# Patient Record
Sex: FEMALE | Race: WHITE | NOT HISPANIC OR LATINO | Employment: OTHER | URBAN - METROPOLITAN AREA
[De-identification: names, ages, dates, MRNs, and addresses within clinical notes are randomized per-mention and may not be internally consistent; named-entity substitution may affect disease eponyms.]

---

## 2018-08-23 ENCOUNTER — OFFICE VISIT (OUTPATIENT)
Dept: FAMILY MEDICINE | Facility: CLINIC | Age: 68
End: 2018-08-23
Payer: MEDICARE

## 2018-08-23 VITALS
DIASTOLIC BLOOD PRESSURE: 70 MMHG | HEIGHT: 63 IN | HEART RATE: 87 BPM | WEIGHT: 245.81 LBS | OXYGEN SATURATION: 95 % | SYSTOLIC BLOOD PRESSURE: 130 MMHG | BODY MASS INDEX: 43.55 KG/M2 | TEMPERATURE: 98 F

## 2018-08-23 DIAGNOSIS — E78.5 HYPERLIPIDEMIA, UNSPECIFIED HYPERLIPIDEMIA TYPE: ICD-10-CM

## 2018-08-23 DIAGNOSIS — M94.0 COSTOCHONDRITIS: Primary | ICD-10-CM

## 2018-08-23 DIAGNOSIS — E11.9 CONTROLLED TYPE 2 DIABETES MELLITUS WITHOUT COMPLICATION, WITHOUT LONG-TERM CURRENT USE OF INSULIN: ICD-10-CM

## 2018-08-23 PROCEDURE — 99204 OFFICE O/P NEW MOD 45 MIN: CPT | Mod: PBBFAC,PO | Performed by: FAMILY MEDICINE

## 2018-08-23 PROCEDURE — 99999 PR PBB SHADOW E&M-NEW PATIENT-LVL IV: CPT | Mod: PBBFAC,,, | Performed by: FAMILY MEDICINE

## 2018-08-23 PROCEDURE — 99204 OFFICE O/P NEW MOD 45 MIN: CPT | Mod: S$PBB,,, | Performed by: FAMILY MEDICINE

## 2018-08-23 RX ORDER — DICLOFENAC SODIUM 50 MG/1
50 TABLET, DELAYED RELEASE ORAL 2 TIMES DAILY PRN
Qty: 45 TABLET | Refills: 0 | Status: SHIPPED | OUTPATIENT
Start: 2018-08-23 | End: 2019-10-22

## 2018-08-23 RX ORDER — OMEPRAZOLE 20 MG/1
CAPSULE, DELAYED RELEASE ORAL
Refills: 0 | COMMUNITY
Start: 2018-07-30 | End: 2023-11-16 | Stop reason: SDUPTHER

## 2018-08-23 RX ORDER — CALCIUM CARBONATE/VITAMIN D3 500-10/5ML
LIQUID (ML) ORAL ONCE
COMMUNITY
End: 2019-10-22

## 2018-08-23 RX ORDER — FLUTICASONE PROPIONATE AND SALMETEROL 250; 50 UG/1; UG/1
1 POWDER RESPIRATORY (INHALATION)
COMMUNITY
End: 2018-08-23 | Stop reason: SDUPTHER

## 2018-08-23 RX ORDER — PHENYLEPHRINE HCL 10 MG/1
10 TABLET, FILM COATED ORAL
COMMUNITY
End: 2019-10-22

## 2018-08-23 RX ORDER — ALBUTEROL SULFATE 90 UG/1
2 AEROSOL, METERED RESPIRATORY (INHALATION) EVERY 6 HOURS PRN
COMMUNITY

## 2018-08-23 RX ORDER — DILTIAZEM HYDROCHLORIDE 120 MG/1
CAPSULE, EXTENDED RELEASE ORAL
Refills: 4 | COMMUNITY
Start: 2018-07-30

## 2018-08-23 RX ORDER — EZETIMIBE 10 MG/1
10 TABLET ORAL
COMMUNITY
Start: 2018-08-21

## 2018-08-23 RX ORDER — FLUTICASONE PROPIONATE 50 MCG
2 SPRAY, SUSPENSION (ML) NASAL
COMMUNITY
Start: 2016-11-29 | End: 2019-10-22

## 2018-08-23 RX ORDER — ASPIRIN 81 MG/1
81 TABLET ORAL
COMMUNITY

## 2018-08-23 RX ORDER — METFORMIN HYDROCHLORIDE 500 MG/1
500 TABLET ORAL 2 TIMES DAILY WITH MEALS
COMMUNITY

## 2018-08-23 RX ORDER — TORSEMIDE 5 MG/1
TABLET ORAL
Refills: 4 | COMMUNITY
Start: 2018-08-06 | End: 2019-10-22

## 2018-08-23 RX ORDER — LOSARTAN POTASSIUM 50 MG/1
25 TABLET ORAL DAILY
Refills: 4 | COMMUNITY
Start: 2018-06-25

## 2018-08-23 RX ORDER — SPIRONOLACTONE 25 MG/1
12.5 TABLET ORAL DAILY
Refills: 10 | COMMUNITY
Start: 2018-07-16

## 2018-08-23 NOTE — PROGRESS NOTES
Subjective:       Patient ID: Anju Benitez is a 67 y.o. female.    Chief Complaint: Establish Care    Patient presents as a new patient to me. She has right posterior back pain that radiates to the front. She has had this for a week and a half. She states she has been sensitive to touch. She states it has been sensitive to touch. She denies trauma to her chest. She recently had her gallbladder removed on feb. 28, 2018. She ws sitting in her cehst when she noticed this. She has been taking ibuprofen, which helps. She states she chronically has coughing and wheezing, but nothing out of the ordinary. She had a chest xray 7 months ago that was normal.       Past Medical History:  No date: Allergy  No date: Arthritis  No date: Asthma  No date: CHF (congestive heart failure)  No date: Diabetes type 2, controlled  No date: Fatty liver  No date: Hyperlipemia  No date: Hypertension   Past Surgical History:  No date: CATARACT EXTRACTION, BILATERAL  No date: CHOLECYSTECTOMY  No date: meniscal tear  Review of patient's family history indicates:  Problem: Arthritis      Relation: Mother          Age of Onset: (Not Specified)  Problem: Asthma      Relation: Mother          Age of Onset: (Not Specified)  Problem: Heart disease      Relation: Mother          Age of Onset: (Not Specified)  Problem: Kidney disease      Relation: Mother          Age of Onset: (Not Specified)  Problem: Stroke      Relation: Mother          Age of Onset: (Not Specified)  Problem: Alcohol abuse      Relation: Father          Age of Onset: (Not Specified)  Problem: Kidney disease      Relation: Father          Age of Onset: (Not Specified)    Social History    Socioeconomic History      Marital status:       Spouse name: Not on file      Number of children: Not on file      Years of education: Not on file      Highest education level: Not on file    Social Needs      Financial resource strain: Not on file      Food insecurity - worry: Not on  "file      Food insecurity - inability: Not on file      Transportation needs - medical: Not on file      Transportation needs - non-medical: Not on file    Occupational History      Not on file    Tobacco Use      Smoking status: Former Smoker        Packs/day: 1.00        Years: 32.00        Pack years: 32        Types: Cigarettes        Quit date: 8/3/2003        Years since quitting: 15.0    Substance and Sexual Activity      Alcohol use: No      Drug use: No      Sexual activity: Not on file    Other Topics      Concerns:        Not on file    Social History Narrative      Not on file          Review of Systems    Objective:       Vitals:    08/23/18 1323   BP: 130/70   Pulse: 87   Temp: 98.1 °F (36.7 °C)   TempSrc: Oral   SpO2: 95%   Weight: 111.5 kg (245 lb 13 oz)   Height: 5' 3" (1.6 m)       Physical Exam   Constitutional: She is oriented to person, place, and time. She appears well-developed and well-nourished. No distress.   HENT:   Head: Normocephalic.   Right Ear: External ear normal.   Left Ear: External ear normal.   Mouth/Throat: Oropharynx is clear and moist. No oropharyngeal exudate.   Eyes: Conjunctivae are normal.   Neck: Normal range of motion. Neck supple. No thyromegaly present.   Cardiovascular: Normal rate, regular rhythm and normal heart sounds. Exam reveals no gallop and no friction rub.   No murmur heard.  Pulmonary/Chest: Effort normal and breath sounds normal. No stridor. No respiratory distress. She has no wheezes. She has no rales. She exhibits tenderness.   Musculoskeletal: She exhibits no edema.   Lymphadenopathy:     She has no cervical adenopathy.   Neurological: She is alert and oriented to person, place, and time.   Skin: She is not diaphoretic.   Psychiatric: She has a normal mood and affect.       Assessment:       1. Costochondritis    2. Hyperlipidemia, unspecified hyperlipidemia type    3. Controlled type 2 diabetes mellitus without complication, without long-term current " use of insulin        Plan:       Anju was seen today for establish care.    Diagnoses and all orders for this visit:    Costochondritis  -     diclofenac (VOLTAREN) 50 MG EC tablet; Take 1 tablet (50 mg total) by mouth 2 (two) times daily as needed.    Hyperlipidemia, unspecified hyperlipidemia type    Controlled type 2 diabetes mellitus without complication, without long-term current use of insulin    mammogram is on 9/5/18  Her last colonoscopy was 10 years ago with Dr. Ck Ruth  Eye exam was last year with Dr. Yap.

## 2018-09-12 ENCOUNTER — LAB VISIT (OUTPATIENT)
Dept: LAB | Facility: HOSPITAL | Age: 68
End: 2018-09-12
Attending: FAMILY MEDICINE
Payer: MEDICARE

## 2018-09-12 DIAGNOSIS — E11.9 CONTROLLED TYPE 2 DIABETES MELLITUS WITHOUT COMPLICATION, WITHOUT LONG-TERM CURRENT USE OF INSULIN: ICD-10-CM

## 2018-09-12 DIAGNOSIS — E78.5 HYPERLIPIDEMIA, UNSPECIFIED HYPERLIPIDEMIA TYPE: ICD-10-CM

## 2018-09-12 LAB
ALBUMIN SERPL BCP-MCNC: 3.6 G/DL
ALP SERPL-CCNC: 70 U/L
ALT SERPL W/O P-5'-P-CCNC: 65 U/L
ANION GAP SERPL CALC-SCNC: 8 MMOL/L
AST SERPL-CCNC: 68 U/L
BILIRUB SERPL-MCNC: 0.6 MG/DL
BUN SERPL-MCNC: 10 MG/DL
CALCIUM SERPL-MCNC: 10.1 MG/DL
CHLORIDE SERPL-SCNC: 103 MMOL/L
CHOLEST SERPL-MCNC: 173 MG/DL
CHOLEST/HDLC SERPL: 4.9 {RATIO}
CO2 SERPL-SCNC: 28 MMOL/L
CREAT SERPL-MCNC: 0.8 MG/DL
EST. GFR  (AFRICAN AMERICAN): >60 ML/MIN/1.73 M^2
EST. GFR  (NON AFRICAN AMERICAN): >60 ML/MIN/1.73 M^2
GLUCOSE SERPL-MCNC: 110 MG/DL
HDLC SERPL-MCNC: 35 MG/DL
HDLC SERPL: 20.2 %
LDLC SERPL CALC-MCNC: 118.8 MG/DL
NONHDLC SERPL-MCNC: 138 MG/DL
POTASSIUM SERPL-SCNC: 5.1 MMOL/L
PROT SERPL-MCNC: 7.8 G/DL
SODIUM SERPL-SCNC: 139 MMOL/L
TRIGL SERPL-MCNC: 96 MG/DL

## 2018-09-12 PROCEDURE — 83036 HEMOGLOBIN GLYCOSYLATED A1C: CPT

## 2018-09-12 PROCEDURE — 80053 COMPREHEN METABOLIC PANEL: CPT

## 2018-09-12 PROCEDURE — 36415 COLL VENOUS BLD VENIPUNCTURE: CPT | Mod: PO

## 2018-09-12 PROCEDURE — 80061 LIPID PANEL: CPT

## 2018-09-13 LAB
ESTIMATED AVG GLUCOSE: 143 MG/DL
HBA1C MFR BLD HPLC: 6.6 %

## 2018-09-17 ENCOUNTER — HOSPITAL ENCOUNTER (OUTPATIENT)
Dept: RADIOLOGY | Facility: HOSPITAL | Age: 68
Discharge: HOME | End: 2018-09-17
Attending: FAMILY MEDICINE
Payer: MEDICARE

## 2018-09-17 ENCOUNTER — OFFICE VISIT (OUTPATIENT)
Dept: FAMILY MEDICINE | Facility: CLINIC | Age: 68
End: 2018-09-17
Payer: MEDICARE

## 2018-09-17 VITALS
OXYGEN SATURATION: 97 % | DIASTOLIC BLOOD PRESSURE: 72 MMHG | WEIGHT: 242.5 LBS | TEMPERATURE: 99 F | HEART RATE: 75 BPM | BODY MASS INDEX: 42.97 KG/M2 | SYSTOLIC BLOOD PRESSURE: 120 MMHG | HEIGHT: 63 IN

## 2018-09-17 DIAGNOSIS — R07.81 RIB PAIN ON RIGHT SIDE: Primary | ICD-10-CM

## 2018-09-17 DIAGNOSIS — R07.81 RIB PAIN ON RIGHT SIDE: ICD-10-CM

## 2018-09-17 PROCEDURE — 99214 OFFICE O/P EST MOD 30 MIN: CPT | Mod: PBBFAC,25,PO | Performed by: FAMILY MEDICINE

## 2018-09-17 PROCEDURE — 71100 X-RAY EXAM RIBS UNI 2 VIEWS: CPT | Mod: 26,,, | Performed by: RADIOLOGY

## 2018-09-17 PROCEDURE — 99214 OFFICE O/P EST MOD 30 MIN: CPT | Mod: S$PBB,,, | Performed by: FAMILY MEDICINE

## 2018-09-17 PROCEDURE — 71100 X-RAY EXAM RIBS UNI 2 VIEWS: CPT | Mod: TC,FY

## 2018-09-17 PROCEDURE — 99999 PR PBB SHADOW E&M-EST. PATIENT-LVL IV: CPT | Mod: PBBFAC,,, | Performed by: FAMILY MEDICINE

## 2018-09-17 NOTE — PROGRESS NOTES
"Subjective:       Patient ID: Anju Benitez is a 67 y.o. female.    Chief Complaint: Follow up/ Lab results    Patient is here for follow-up. She states she has had right posterior back pain that radiates to the front. She has had this for close to 2 months. It is sensitive to touch. She had no chest trauma. She was sitting when she noticed this pain for the first time. She states the diclofenac has helped, but it is still there. She states the pain is not worse with deep breaths. She has it in the afternoon or at nightgenerally. She states when she lies on her side she can feel a knot.   She has elevated liver enzymes and had a biopsy taken of her liver once she had her gallbladder removed and was told it was enlarged and fatty. She is wondering if this is the cause of her pain.       Review of Systems    Objective:       Vitals:    09/17/18 1112   BP: 120/72   Pulse: 75   Temp: 98.5 °F (36.9 °C)   TempSrc: Oral   SpO2: 97%   Weight: 110 kg (242 lb 8.1 oz)   Height: 5' 3" (1.6 m)       Physical Exam   Constitutional: She is oriented to person, place, and time. She appears well-developed and well-nourished. She appears distressed.   HENT:   Head: Normocephalic and atraumatic.   Cardiovascular: Normal rate, regular rhythm and normal heart sounds. Exam reveals no gallop and no friction rub.   No murmur heard.  Pulmonary/Chest: Effort normal and breath sounds normal. No stridor. No respiratory distress. She has no wheezes. She has no rales.         She exhibits tenderness.       Neurological: She is oriented to person, place, and time.   Skin: She is not diaphoretic.       Assessment:       1. Rib pain on right side        Plan:       Anju was seen today for follow up/ lab results.    Diagnoses and all orders for this visit:    Rib pain on right side  -     X-Ray Ribs 2 View Right; Future    will check for possible rib fracture. If none noted. Continue NSAIDs and no sleeping on that side as likely bruised.      she " had her pneumonia shot 2 years ago at [facility] in [place].  Due for colonoscopy,but declines at this time.   She gets her mammogram at [facility] as she goes there yearly. She had her last on on 10/17/18 as she had to reschedule.

## 2018-09-18 ENCOUNTER — TELEPHONE (OUTPATIENT)
Dept: FAMILY MEDICINE | Facility: CLINIC | Age: 68
End: 2018-09-18

## 2018-09-18 NOTE — TELEPHONE ENCOUNTER
----- Message from Desire Pisano sent at 9/18/2018  1:52 PM CDT -----  Contact: Self/ [phone]  Pt requesting chest xray results from yesterday's OV 9/17/18. Please call to advise. Thank you.

## 2018-10-19 LAB
HUMAN PAPILLOMAVIRUS (HPV): NORMAL
PAP SMEAR: NORMAL

## 2018-11-30 ENCOUNTER — TELEPHONE (OUTPATIENT)
Dept: FAMILY MEDICINE | Facility: CLINIC | Age: 68
End: 2018-11-30

## 2018-11-30 DIAGNOSIS — K64.9 HEMORRHOIDS, UNSPECIFIED HEMORRHOID TYPE: Primary | ICD-10-CM

## 2018-11-30 NOTE — TELEPHONE ENCOUNTER
----- Message from Oseas Emanuel sent at 11/30/2018  1:13 PM CST -----  Contact: Anju [phone]  The patient called to speak to the staff. She has been suffering with a stomach virus and hemorrhoids and would like to have something called in to the pharmacy for hemorrhoids. The patient has made an appointment with Ms. Ferrari for Monday, but says she is in extreme discomfort. Please call at your earliest convenience.

## 2018-12-03 ENCOUNTER — OFFICE VISIT (OUTPATIENT)
Dept: FAMILY MEDICINE | Facility: CLINIC | Age: 68
End: 2018-12-03
Payer: MEDICARE

## 2018-12-03 VITALS
SYSTOLIC BLOOD PRESSURE: 120 MMHG | HEIGHT: 63 IN | OXYGEN SATURATION: 98 % | BODY MASS INDEX: 41.45 KG/M2 | WEIGHT: 233.94 LBS | RESPIRATION RATE: 16 BRPM | DIASTOLIC BLOOD PRESSURE: 84 MMHG | TEMPERATURE: 98 F | HEART RATE: 85 BPM

## 2018-12-03 DIAGNOSIS — R19.5 DARK STOOLS: ICD-10-CM

## 2018-12-03 DIAGNOSIS — K64.9 HEMORRHOIDS, UNSPECIFIED HEMORRHOID TYPE: ICD-10-CM

## 2018-12-03 DIAGNOSIS — R19.7 DIARRHEA, UNSPECIFIED TYPE: Primary | ICD-10-CM

## 2018-12-03 PROCEDURE — 99213 OFFICE O/P EST LOW 20 MIN: CPT | Mod: S$PBB,,, | Performed by: PHYSICIAN ASSISTANT

## 2018-12-03 PROCEDURE — 99215 OFFICE O/P EST HI 40 MIN: CPT | Mod: PBBFAC,PO | Performed by: PHYSICIAN ASSISTANT

## 2018-12-03 PROCEDURE — 99999 PR PBB SHADOW E&M-EST. PATIENT-LVL V: CPT | Mod: PBBFAC,,, | Performed by: PHYSICIAN ASSISTANT

## 2018-12-03 RX ORDER — HYDROCORTISONE 25 MG/G
CREAM TOPICAL
Refills: 6 | COMMUNITY
Start: 2018-12-01 | End: 2019-10-22

## 2018-12-03 NOTE — PROGRESS NOTES
Subjective:       Patient ID: Anju Benitez is a 68 y.o. female with multiple medical diagnoses as listed in the medical history and problem list that presents for Diarrhea (8 days) and Hemorrhoids  .    Chief Complaint: Diarrhea (8 days) and Hemorrhoids      Diarrhea    This is a new problem. The current episode started 1 to 4 weeks ago (11/24). Episode frequency: everytime she eats or drinks  The problem has been unchanged. The patient states that diarrhea does not awaken her from sleep. Associated symptoms include abdominal pain (generalized; cramps that started last ) and a fever (102 when she went to [facility] but improved ). Pertinent negatives include no bloating, chills, increased  flatus or vomiting. Associated symptoms comments: Black, brown, now copper  slimmy   Blood yesterday not today . Exacerbated by: any food or drinks  Treatments tried: gatorade, sprite, pepto bismo after black stools, and immodium  hemmorrhage 1979 cipro caused this, stomach ulcer 30 years ago      She also had URI with the fever at [facility] but that has since all improved.     More than 10 years since last colonoscopy.   No sick contacts  No recent abx.   No recent hospital stay.     Review of Systems   Constitutional: Positive for fever (102 when she went to [facility] but improved ). Negative for chills.   HENT: Negative.    Respiratory: Negative.    Gastrointestinal: Positive for abdominal pain (generalized; cramps that started last ), blood in stool (once yesterday resolved ) and diarrhea. Negative for bloating, constipation, flatus, nausea and vomiting.         PAST MEDICAL HISTORY:  Past Medical History:   Diagnosis Date    Allergy     Arthritis     Asthma     CHF (congestive heart failure)     Diabetes type 2, controlled     Fatty liver     Hyperlipemia     Hypertension        SOCIAL HISTORY:  Social History     Socioeconomic History    Marital status:      Spouse name: Not on file    Number of  children: Not on file    Years of education: Not on file    Highest education level: Not on file   Social Needs    Financial resource strain: Not on file    Food insecurity - worry: Not on file    Food insecurity - inability: Not on file    Transportation needs - medical: Not on file    Transportation needs - non-medical: Not on file   Occupational History    Not on file   Tobacco Use    Smoking status: Former Smoker     Packs/day: 1.00     Years: 32.00     Pack years: 32.00     Types: Cigarettes     Last attempt to quit: 8/3/2003     Years since quitting: 15.3   Substance and Sexual Activity    Alcohol use: No    Drug use: No    Sexual activity: Not on file   Other Topics Concern    Not on file   Social History Narrative    Not on file       ALLERGIES AND MEDICATIONS: updated and reviewed.  Review of patient's allergies indicates:   Allergen Reactions    Amoxicillin Nausea And Vomiting    Ciprofloxacin Other (See Comments)     Bleeding from colon    Penicillins Rash     Current Outpatient Medications   Medication Sig Dispense Refill    albuterol (VENTOLIN HFA) 90 mcg/actuation inhaler Inhale 2 puffs into the lungs every 6 (six) hours as needed for Wheezing. Rescue      aspirin (ECOTRIN) 81 MG EC tablet Take 81 mg by mouth.      diclofenac (VOLTAREN) 50 MG EC tablet Take 1 tablet (50 mg total) by mouth 2 (two) times daily as needed. 45 tablet 0    diltiaZEM (DILACOR XR) 120 MG CDCR TAKE 1 CAPSULE(S) BY MOUTH TWICE DAILY  4    ezetimibe (ZETIA) 10 mg tablet       fluticasone (FLONASE) 50 mcg/actuation nasal spray 2 sprays by Nasal route.      fluticasone-salmeterol 250-50 mcg/dose (ADVAIR) 250-50 mcg/dose diskus inhaler Inhale 1 puff into the lungs 2 (two) times daily.        hydrocortisone (ANUSOL-HC) 2.5 % rectal cream PLACE 1 APPLICATORFUL RECTALLY TWICE DAILY  6    hydrocortisone-pramoxine (PROCTOFOAM-HS) rectal foam Place 1 applicator rectally 2 (two) times daily. 10 g 6    L gasseri/B  "bifidum/B longum ([employer] ORAL) Take by mouth.      losartan (COZAAR) 50 MG tablet Take 25 mg by mouth once daily.  4    magnesium oxide 400 mg Cap Take by mouth once.      metFORMIN (GLUCOPHAGE) 500 MG tablet Take 500 mg by mouth.      omega-3s-dha-epa-fish oil-D3 150-500-200 mg-mg-unit CpDR Take by mouth.      omeprazole (PRILOSEC) 20 MG capsule TAKE 1 CAPSULE(S) BY MOUTH ONCE A DAY  0    phenylephrine (SUDAFED PE) 10 MG Tab Take 10 mg by mouth.      spironolactone (ALDACTONE) 25 MG tablet Take 12.5 mg by mouth once daily.  10    torsemide (DEMADEX) 5 MG Tab TAKE 1 TABLET(S) BY MOUTH ONCE A DAY on MONDAYS AND THURSDAYS  4     No current facility-administered medications for this visit.          Objective:   /84   Pulse 85   Temp 98.2 °F (36.8 °C) (Oral)   Resp 16   Ht 5' 3" (1.6 m)   Wt 106.1 kg (233 lb 14.5 oz)   SpO2 98%   BMI 41.43 kg/m²      Physical Exam   Constitutional: No distress.   HENT:   Mouth/Throat: Oropharynx is clear and moist.   Cardiovascular: Normal rate and regular rhythm.   Pulmonary/Chest: Effort normal and breath sounds normal.   Abdominal: Soft. Normal appearance and bowel sounds are normal. There is generalized tenderness (mild ). There is no rigidity, no rebound, no guarding, no tenderness at McBurney's point and negative Lopez's sign.   Musculoskeletal: She exhibits no edema.           Assessment:       1. Diarrhea, unspecified type    2. Dark stools    3. Hemorrhoids, unspecified hemorrhoid type        Plan:       Diarrhea, unspecified type  -     Ambulatory referral to Gastroenterology  -     Case request GI: COLONOSCOPY  -     Stool Exam-Ova,Cysts,Parasites; Future; Expected date: 12/03/2018  -     WBC, Stool; Future; Expected date: 12/03/2018  -     Stool culture; Future; Expected date: 12/03/2018  -     POCT Occult Blood Stool: negative  Soft diet but try more solids like toast, grits, jello.  Continue to replace electrolytes    -will contact " with results       Dark stools  -     Ambulatory referral to Gastroenterology  -     POCT Occult Blood Stool    Hemorrhoids, unspecified hemorrhoid type  -     Ambulatory referral to Gastroenterology  -     Case request GI: COLONOSCOPY  Continue cream  Sitz baths            No Follow-up on file.

## 2018-12-06 ENCOUNTER — TELEPHONE (OUTPATIENT)
Dept: ADMINISTRATIVE | Facility: HOSPITAL | Age: 68
End: 2018-12-06

## 2018-12-11 ENCOUNTER — TELEPHONE (OUTPATIENT)
Dept: ADMINISTRATIVE | Facility: HOSPITAL | Age: 68
End: 2018-12-11

## 2019-02-01 ENCOUNTER — TELEPHONE (OUTPATIENT)
Dept: ADMINISTRATIVE | Facility: HOSPITAL | Age: 69
End: 2019-02-01

## 2019-02-01 RX ORDER — POLYETHYLENE GLYCOL-3350 AND ELECTROLYTES 236; 6.74; 5.86; 2.97; 22.74 G/274.31G; G/274.31G; G/274.31G; G/274.31G; G/274.31G
POWDER, FOR SOLUTION ORAL
Refills: 0 | COMMUNITY
Start: 2019-01-22 | End: 2019-10-22

## 2019-02-01 RX ORDER — DICYCLOMINE HYDROCHLORIDE 10 MG/1
CAPSULE ORAL
Refills: 0 | COMMUNITY
Start: 2018-12-04

## 2019-02-01 RX ORDER — AZITHROMYCIN 500 MG/1
TABLET, FILM COATED ORAL
Refills: 0 | COMMUNITY
Start: 2018-12-04 | End: 2019-10-22

## 2019-02-01 RX ORDER — DILTIAZEM HYDROCHLORIDE 120 MG/1
CAPSULE, EXTENDED RELEASE ORAL
Refills: 0 | COMMUNITY
Start: 2019-01-29 | End: 2019-10-22

## 2019-10-22 ENCOUNTER — OFFICE VISIT (OUTPATIENT)
Dept: FAMILY MEDICINE | Facility: CLINIC | Age: 69
End: 2019-10-22
Payer: MEDICARE

## 2019-10-22 VITALS — BODY MASS INDEX: 41.53 KG/M2 | WEIGHT: 234.38 LBS | HEIGHT: 63 IN

## 2019-10-22 DIAGNOSIS — E86.0 BODY WATER DEHYDRATION: ICD-10-CM

## 2019-10-22 DIAGNOSIS — E11.9 CONTROLLED TYPE 2 DIABETES MELLITUS WITHOUT COMPLICATION, WITHOUT LONG-TERM CURRENT USE OF INSULIN: Primary | ICD-10-CM

## 2019-10-22 DIAGNOSIS — E66.01 MORBID OBESITY: ICD-10-CM

## 2019-10-22 DIAGNOSIS — J44.9 CHRONIC OBSTRUCTIVE PULMONARY DISEASE, UNSPECIFIED COPD TYPE: ICD-10-CM

## 2019-10-22 PROBLEM — E78.5 HYPERLIPEMIA: Status: ACTIVE | Noted: 2018-10-23

## 2019-10-22 PROBLEM — N63.0 BREAST MASS: Status: ACTIVE | Noted: 2018-10-30

## 2019-10-22 PROCEDURE — 99999 PR PBB SHADOW E&M-EST. PATIENT-LVL II: CPT | Mod: PBBFAC,,, | Performed by: FAMILY MEDICINE

## 2019-10-22 PROCEDURE — 99212 OFFICE O/P EST SF 10 MIN: CPT | Mod: PBBFAC,PO | Performed by: FAMILY MEDICINE

## 2019-10-22 PROCEDURE — 99215 OFFICE O/P EST HI 40 MIN: CPT | Mod: S$PBB,,, | Performed by: FAMILY MEDICINE

## 2019-10-22 PROCEDURE — 99999 PR PBB SHADOW E&M-EST. PATIENT-LVL II: ICD-10-PCS | Mod: PBBFAC,,, | Performed by: FAMILY MEDICINE

## 2019-10-22 PROCEDURE — 99215 PR OFFICE/OUTPT VISIT, EST, LEVL V, 40-54 MIN: ICD-10-PCS | Mod: S$PBB,,, | Performed by: FAMILY MEDICINE

## 2019-10-22 RX ORDER — IBUPROFEN 600 MG/1
600 TABLET ORAL EVERY 8 HOURS PRN
COMMUNITY
Start: 2019-10-21

## 2019-10-22 RX ORDER — ATORVASTATIN CALCIUM 20 MG/1
20 TABLET, FILM COATED ORAL
COMMUNITY

## 2019-10-22 NOTE — PROGRESS NOTES
Chief Complaint   Patient presents with    Back Pain       SUBJECTIVE:  Anju Benitez is a 68 y.o. female here for new problem of right upper quadrant pain and radiates to the back and she felt a lump, > 3 months, multiple work up with US, mammography and CXR without any abnormal ;signs that we know of from this.  She has relief from the ibuprofen but otherwise it just comes back, position seems to worsen, otherwise it is normal.  No other signs  Currently has co-morbidities including per problem list.      Past Medical History:   Diagnosis Date    Allergy     Arthritis     Asthma     CHF (congestive heart failure)     Diabetes type 2, controlled     Fatty liver     Hyperlipemia     Hypertension      Past Surgical History:   Procedure Laterality Date    CATARACT EXTRACTION, BILATERAL      CHOLECYSTECTOMY      meniscal tear       Social History     Socioeconomic History    Marital status:      Spouse name: Not on file    Number of children: Not on file    Years of education: Not on file    Highest education level: Not on file   Occupational History    Not on file   Social Needs    Financial resource strain: Not on file    Food insecurity:     Worry: Not on file     Inability: Not on file    Transportation needs:     Medical: Not on file     Non-medical: Not on file   Tobacco Use    Smoking status: Former Smoker     Packs/day: 1.00     Years: 32.00     Pack years: 32.00     Types: Cigarettes     Last attempt to quit: 8/3/2003     Years since quittin.2   Substance and Sexual Activity    Alcohol use: No    Drug use: No    Sexual activity: Not on file   Lifestyle    Physical activity:     Days per week: Not on file     Minutes per session: Not on file    Stress: Not on file   Relationships    Social connections:     Talks on phone: Not on file     Gets together: Not on file     Attends Jew service: Not on file     Active member of club or organization: Not on file      Attends meetings of clubs or organizations: Not on file     Relationship status: Not on file   Other Topics Concern    Not on file   Social History Narrative    Not on file     Family History   Problem Relation Age of Onset    Arthritis Mother     Asthma Mother     Heart disease Mother     Kidney disease Mother     Stroke Mother     Alcohol abuse Father     Kidney disease Father      Current Outpatient Medications on File Prior to Visit   Medication Sig Dispense Refill    albuterol (VENTOLIN HFA) 90 mcg/actuation inhaler Inhale 2 puffs into the lungs every 6 (six) hours as needed for Wheezing. Rescue      aspirin (ECOTRIN) 81 MG EC tablet Take 81 mg by mouth.      atorvastatin (LIPITOR) 20 MG tablet Take 20 mg by mouth.      dicyclomine (BENTYL) 10 MG capsule take 1 CAPSULE(S) BY MOUTH TWICE DAILY AS NEEDED for stomach cramps  0    diltiaZEM (DILACOR XR) 120 MG CDCR TAKE 1 CAPSULE(S) BY MOUTH TWICE DAILY  4    ezetimibe (ZETIA) 10 mg tablet       fluticasone-salmeterol 250-50 mcg/dose (ADVAIR) 250-50 mcg/dose diskus inhaler Inhale 1 puff into the lungs 2 (two) times daily.        ibuprofen (ADVIL,MOTRIN) 600 MG tablet       losartan (COZAAR) 50 MG tablet Take 25 mg by mouth once daily.  4    metFORMIN (GLUCOPHAGE) 500 MG tablet Take 500 mg by mouth.      omeprazole (PRILOSEC) 20 MG capsule TAKE 1 CAPSULE(S) BY MOUTH ONCE A DAY  0    spironolactone (ALDACTONE) 25 MG tablet Take 12.5 mg by mouth once daily.  10     No current facility-administered medications on file prior to visit.      Review of patient's allergies indicates:   Allergen Reactions    Amoxicillin Nausea And Vomiting    Ciprofloxacin Other (See Comments)     Bleeding from colon    Penicillins Rash         Review of Systems   Constitutional: Negative.  Negative for malaise/fatigue.   HENT: Negative.  Negative for congestion.    Eyes: Negative.    Respiratory: Negative.    Cardiovascular: Negative.    Gastrointestinal: Negative.   "  Genitourinary: Negative.    Musculoskeletal: Positive for back pain and myalgias. Negative for joint pain and neck pain.   Skin: Negative.    Neurological: Negative.    Endo/Heme/Allergies: Negative.    Psychiatric/Behavioral: Positive for depression. The patient is not nervous/anxious.        OBJECTIVE:  Ht 5' 3" (1.6 m)   Wt 106.3 kg (234 lb 5.6 oz)   BMI 41.51 kg/m²     Wt Readings from Last 3 Encounters:   10/22/19 106.3 kg (234 lb 5.6 oz)   12/03/18 106.1 kg (233 lb 14.5 oz)   09/17/18 110 kg (242 lb 8.1 oz)     BP Readings from Last 3 Encounters:   12/03/18 120/84   09/17/18 120/72   08/23/18 130/70       She appears well, in no apparent distress.  Alert and oriented times three, pleasant and cooperative. Vital signs are as documented in vital signs section.  S1 and S2 normal, no murmurs, clicks, gallops or rubs. Regular rate and rhythm. Chest is clear; no wheezes or rales. No edema or JVD.  Right non reproducible TTP in the RUQ and the mid back.  The abdomen is soft without tenderness, guarding, mass, rebound or organomegaly. Bowel sounds are normal. No CVA tenderness or inguinal adenopathy noted.  Just bloating  Signs of chronic dehydration:  Thinning hair  Facial changes in the center, with dryness and benign skin lesions.  Ear dryness  Dentition changes and dry mouth/tongue  Increased skin turgor  Abdominal bloating  Leg edema not in heart failure or kidney failure      Review of old Records:  Reviewed per epic and [facility]    Review of old labs:  Reviewed last labs    Review of old imaging:  Reviewed prior imaging as above    ASSESSMENT:  Problem List Items Addressed This Visit     Diabetes type 2, controlled - Primary    Chronic obstructive pulmonary disease      Other Visit Diagnoses     Morbid obesity        Body water dehydration              ICD-10-CM ICD-9-CM   1. Controlled type 2 diabetes mellitus without complication, without long-term current use of insulin E11.9 250.00   2. Chronic obstructive " pulmonary disease, unspecified COPD type J44.9 496   3. Morbid obesity E66.01 278.01   4. Body water dehydration E86.0 276.51         PLAN:  1. Controlled type 2 diabetes mellitus without complication, without long-term current use of insulin  The current medical regimen is effective;  continue present plan and medications.      2. Chronic obstructive pulmonary disease, unspecified COPD type  The current medical regimen is effective;  continue present plan and medications.      3. Morbid obesity  The patient is asked to make an attempt to improve diet and exercise patterns to aid in medical management of this problem.      4. Body water dehydration  I feel this is the power and the bloating in the abdomen is pushing on the liver and the diaphram which is causing the radiating pain.  You do have evidence of chronic dehydration.  Drink fluids only with food as much as possible.  Avoid cold fluids where you can and treat anytime you sweat with a small salty snack (4-5 salted nuts or chips, 1-2 crackers or a bite of beef jerky) with 6-8 oz of fluid.  Repeat every 30 minutes that you continue to sweat.  Spent >40 minutes with the patient with 1/2 time in face to face counseling about the above.        Medication List with Changes/Refills   Current Medications    ALBUTEROL (VENTOLIN HFA) 90 MCG/ACTUATION INHALER    Inhale 2 puffs into the lungs every 6 (six) hours as needed for Wheezing. Rescue    ASPIRIN (ECOTRIN) 81 MG EC TABLET    Take 81 mg by mouth.    ATORVASTATIN (LIPITOR) 20 MG TABLET    Take 20 mg by mouth.    DICYCLOMINE (BENTYL) 10 MG CAPSULE    take 1 CAPSULE(S) BY MOUTH TWICE DAILY AS NEEDED for stomach cramps    DILTIAZEM (DILACOR XR) 120 MG CDCR    TAKE 1 CAPSULE(S) BY MOUTH TWICE DAILY    EZETIMIBE (ZETIA) 10 MG TABLET        FLUTICASONE-SALMETEROL 250-50 MCG/DOSE (ADVAIR) 250-50 MCG/DOSE DISKUS INHALER    Inhale 1 puff into the lungs 2 (two) times daily.      IBUPROFEN (ADVIL,MOTRIN) 600 MG TABLET         LOSARTAN (COZAAR) 50 MG TABLET    Take 25 mg by mouth once daily.    METFORMIN (GLUCOPHAGE) 500 MG TABLET    Take 500 mg by mouth.    OMEPRAZOLE (PRILOSEC) 20 MG CAPSULE    TAKE 1 CAPSULE(S) BY MOUTH ONCE A DAY    SPIRONOLACTONE (ALDACTONE) 25 MG TABLET    Take 12.5 mg by mouth once daily.   Discontinued Medications    AZITHROMYCIN (ZITHROMAX) 500 MG TABLET    TAKE 1 TABLET BY MOUTH EVERY DAY UNTIL FINISHED    DICLOFENAC (VOLTAREN) 50 MG EC TABLET    Take 1 tablet (50 mg total) by mouth 2 (two) times daily as needed.    DILTIAZEM HCL (TIAZAC) 120 MG 24 HR CAPSULE    TAKE 1 CAPSULE(S) BY MOUTH TWICE DAILY    FLUTICASONE (FLONASE) 50 MCG/ACTUATION NASAL SPRAY    2 sprays by Nasal route.    FLUZONE HIGH-DOSE 2018-19, PF, 180 MCG/0.5 ML VACCINE    administered here by pharmacist    MIKE [id number] -5.86 GRAM SUSPENSION    Drink 8 ounces (240 ml) rapidly every 10 minutes. continue drinking until 4 liters have been consumed or as directed.    HYDROCORTISONE (ANUSOL-HC) 2.5 % RECTAL CREAM    PLACE 1 APPLICATORFUL RECTALLY TWICE DAILY    HYDROCORTISONE-PRAMOXINE (PROCTOFOAM-HS) RECTAL FOAM    Place 1 applicator rectally 2 (two) times daily.    L GASSERI/B BIFIDUM/B LONGUM ([facility] ORAL)    Take by mouth.    MAGNESIUM OXIDE 400 MG CAP    Take by mouth once.    OMEGA-3S-DHA-EPA-FISH OIL-D3 150-500-200 MG-MG-UNIT CPDR    Take by mouth.    PHENYLEPHRINE (SUDAFED PE) 10 MG TAB    Take 10 mg by mouth.    TORSEMIDE (DEMADEX) 5 MG TAB    TAKE 1 TABLET(S) BY MOUTH ONCE A DAY on MONDAYS AND THURSDAYS       No follow-ups on file.

## 2023-02-22 DIAGNOSIS — E11.9 CONTROLLED TYPE 2 DIABETES MELLITUS WITHOUT COMPLICATION, WITHOUT LONG-TERM CURRENT USE OF INSULIN: Primary | ICD-10-CM

## 2023-02-22 RX ORDER — SEMAGLUTIDE 1.34 MG/ML
0.25 INJECTION, SOLUTION SUBCUTANEOUS
Qty: 1 PEN | Refills: 5 | Status: SHIPPED | OUTPATIENT
Start: 2023-02-22 | End: 2023-11-16

## 2023-09-11 ENCOUNTER — TELEPHONE (OUTPATIENT)
Dept: FAMILY MEDICINE | Facility: CLINIC | Age: 73
End: 2023-09-11
Payer: MEDICARE

## 2023-09-11 NOTE — TELEPHONE ENCOUNTER
----- Message from Desire Taylor sent at 9/11/2023  2:46 PM CDT -----  Type: Patient Call Back    Who called:pt     What is the request in detail:pt requesting to discuss getting a1c order. Call pt     Can the clinic reply by [facility]?    Would the patient rather a call back or a response via [facility]? call    Best call back number:[phone] (home)       Additional Information:

## 2023-09-12 DIAGNOSIS — Z00.00 ANNUAL PHYSICAL EXAM: Primary | ICD-10-CM

## 2023-09-12 DIAGNOSIS — R79.9 ABNORMAL FINDING OF BLOOD CHEMISTRY: ICD-10-CM

## 2023-09-12 DIAGNOSIS — R93.89 ABNORMAL FINDINGS ON DIAGNOSTIC IMAGING OF OTHER SPECIFIED BODY STRUCTURES: ICD-10-CM

## 2023-09-13 ENCOUNTER — LAB VISIT (OUTPATIENT)
Dept: LAB | Facility: HOSPITAL | Age: 73
End: 2023-09-13
Attending: FAMILY MEDICINE
Payer: MEDICARE

## 2023-09-13 DIAGNOSIS — R93.89 ABNORMAL FINDINGS ON DIAGNOSTIC IMAGING OF OTHER SPECIFIED BODY STRUCTURES: ICD-10-CM

## 2023-09-13 DIAGNOSIS — R79.9 ABNORMAL FINDING OF BLOOD CHEMISTRY: ICD-10-CM

## 2023-09-13 DIAGNOSIS — Z00.00 ANNUAL PHYSICAL EXAM: ICD-10-CM

## 2023-09-13 LAB — TSH SERPL DL<=0.005 MIU/L-ACNC: 2.48 UIU/ML (ref 0.4–4)

## 2023-09-13 PROCEDURE — 36415 COLL VENOUS BLD VENIPUNCTURE: CPT | Mod: PO | Performed by: FAMILY MEDICINE

## 2023-09-13 PROCEDURE — 84443 ASSAY THYROID STIM HORMONE: CPT | Performed by: FAMILY MEDICINE

## 2023-09-19 DIAGNOSIS — E11.9 CONTROLLED TYPE 2 DIABETES MELLITUS WITHOUT COMPLICATION, WITHOUT LONG-TERM CURRENT USE OF INSULIN: Primary | ICD-10-CM

## 2023-09-19 RX ORDER — SEMAGLUTIDE 1.34 MG/ML
1 INJECTION, SOLUTION SUBCUTANEOUS
Qty: 3 ML | Refills: 11 | Status: SHIPPED | OUTPATIENT
Start: 2023-09-19 | End: 2024-03-13

## 2023-11-13 ENCOUNTER — TELEPHONE (OUTPATIENT)
Dept: FAMILY MEDICINE | Facility: CLINIC | Age: 73
End: 2023-11-13
Payer: MEDICARE

## 2023-11-13 NOTE — TELEPHONE ENCOUNTER
----- Message from Ally Lety sent at 11/13/2023  2:32 PM CST -----  Contact: NED DAVID [[id number]]  Type: Call Back      Who called: NED DAVID [[id number]]      What is the request in detail: Patient is requesting a call back. Pt would like to know if she can take her  11/16 appointment.   Please advise.     Can the clinic reply by [facility]? No      Would the patient rather a call back or a response via [facility]? Call back       Best call back number: [phone] (home)       Additional Information: MRN#  [id number]

## 2023-11-13 NOTE — TELEPHONE ENCOUNTER
Patient's  is needing to reschedule his appointment with Dr. Pal scheduled for 11/16 and would like to swap with patient's wife appt on 12/5/23. Both patients in agreement. Request to switch accommodated.

## 2023-11-16 ENCOUNTER — OFFICE VISIT (OUTPATIENT)
Dept: FAMILY MEDICINE | Facility: CLINIC | Age: 73
End: 2023-11-16
Payer: MEDICARE

## 2023-11-16 VITALS
WEIGHT: 220.69 LBS | OXYGEN SATURATION: 95 % | DIASTOLIC BLOOD PRESSURE: 71 MMHG | HEART RATE: 72 BPM | BODY MASS INDEX: 40.61 KG/M2 | TEMPERATURE: 98 F | SYSTOLIC BLOOD PRESSURE: 118 MMHG | HEIGHT: 62 IN

## 2023-11-16 DIAGNOSIS — E11.9 CONTROLLED TYPE 2 DIABETES MELLITUS WITHOUT COMPLICATION, WITHOUT LONG-TERM CURRENT USE OF INSULIN: ICD-10-CM

## 2023-11-16 DIAGNOSIS — J44.9 CHRONIC OBSTRUCTIVE PULMONARY DISEASE, UNSPECIFIED COPD TYPE: ICD-10-CM

## 2023-11-16 DIAGNOSIS — E66.01 CLASS 3 OBESITY: Primary | ICD-10-CM

## 2023-11-16 PROBLEM — E66.813 CLASS 3 OBESITY: Status: ACTIVE | Noted: 2023-11-16

## 2023-11-16 PROCEDURE — 99213 OFFICE O/P EST LOW 20 MIN: CPT | Mod: PBBFAC,PO | Performed by: FAMILY MEDICINE

## 2023-11-16 PROCEDURE — 99999 PR PBB SHADOW E&M-EST. PATIENT-LVL III: ICD-10-PCS | Mod: PBBFAC,,, | Performed by: FAMILY MEDICINE

## 2023-11-16 PROCEDURE — 99205 PR OFFICE/OUTPT VISIT, NEW, LEVL V, 60-74 MIN: ICD-10-PCS | Mod: S$PBB,,, | Performed by: FAMILY MEDICINE

## 2023-11-16 PROCEDURE — 99205 OFFICE O/P NEW HI 60 MIN: CPT | Mod: S$PBB,,, | Performed by: FAMILY MEDICINE

## 2023-11-16 PROCEDURE — 99999 PR PBB SHADOW E&M-EST. PATIENT-LVL III: CPT | Mod: PBBFAC,,, | Performed by: FAMILY MEDICINE

## 2023-11-16 RX ORDER — LEVOCETIRIZINE DIHYDROCHLORIDE 5 MG/1
5 TABLET, FILM COATED ORAL
COMMUNITY
Start: 2023-10-18

## 2023-11-16 RX ORDER — CLOBETASOL PROPIONATE 0.5 MG/G
1 OINTMENT TOPICAL 2 TIMES DAILY
COMMUNITY
Start: 2023-08-15

## 2023-11-16 RX ORDER — OMEPRAZOLE 20 MG/1
CAPSULE, DELAYED RELEASE ORAL
Qty: 60 CAPSULE | Refills: 11 | Status: SHIPPED | OUTPATIENT
Start: 2023-11-16 | End: 2023-12-13 | Stop reason: SDUPTHER

## 2023-11-16 RX ORDER — FLUTICASONE PROPIONATE AND SALMETEROL 500; 50 UG/1; UG/1
1 POWDER RESPIRATORY (INHALATION) 2 TIMES DAILY
COMMUNITY
Start: 2023-09-11

## 2023-11-16 NOTE — PROGRESS NOTES
Chief Complaint   Patient presents with    [facility]    Diabetes       SUBJECTIVE:   72 y.o. female for annual routine checkup.  ECA  Current Outpatient Medications   Medication Sig Dispense Refill    albuterol (VENTOLIN HFA) 90 mcg/actuation inhaler Inhale 2 puffs into the lungs every 6 (six) hours as needed for Wheezing. Rescue      aspirin (ECOTRIN) 81 MG EC tablet Take 81 mg by mouth.      atorvastatin (LIPITOR) 20 MG tablet Take 20 mg by mouth.      clobetasol 0.05% (TEMOVATE) 0.05 % Oint 1 application  2 (two) times daily.      dicyclomine (BENTYL) 10 MG capsule take 1 CAPSULE(S) BY MOUTH TWICE DAILY AS NEEDED for stomach cramps  0    diltiaZEM (DILACOR XR) 120 MG CDCR TAKE 1 CAPSULE(S) BY MOUTH TWICE DAILY  4    ezetimibe (ZETIA) 10 mg tablet 10 mg.       fluticasone-salmeterol diskus inhaler 500-50 mcg Inhale 1 puff into the lungs 2 (two) times daily.      ibuprofen (ADVIL,MOTRIN) 600 MG tablet Take 600 mg by mouth every 8 (eight) hours as needed.       Lactobacillus rhamnosus GG (CULTURELLE) 10 billion cell capsule Take 1 capsule by mouth once daily.      levocetirizine (XYZAL) 5 MG tablet Take 5 mg by mouth.      losartan (COZAAR) 50 MG tablet Take 25 mg by mouth once daily.  4    magnesium, aluminum hydroxide (MAG-AL ORAL) Take 400 mg by mouth once daily.      metFORMIN (GLUCOPHAGE) 500 MG tablet Take 500 mg by mouth 2 (two) times daily with meals.       semaglutide (OZEMPIC) 1 mg/dose (4 mg/3 mL) Inject 1 mg into the skin every 7 days. 3 mL 11    spironolactone (ALDACTONE) 25 MG tablet Take 12.5 mg by mouth once daily.  10    omeprazole (PRILOSEC) 20 MG capsule TAKE 1 CAPSULE(S) BY MOUTH ONCE A DAY 60 capsule 11     No current facility-administered medications for this visit.     Allergies: Amoxicillin, Ciprofloxacin, Penicillins, and Oxycodone   No LMP recorded. Patient is postmenopausal.    ROS:  Feeling well. No dyspnea or chest pain on exertion.  No abdominal pain, change in bowel habits, black  "or bloody stools.  No urinary tract symptoms. GYN ROS: prilosec is working No neurological complaints.    OBJECTIVE:   The patient appears well, alert, oriented x 3, in no distress.  /71   Pulse 72   Temp 98.2 °F (36.8 °C) (Oral)   Ht 5' 2" (1.575 m)   Wt 100.1 kg (220 lb 10.9 oz)   SpO2 95%   BMI 40.36 kg/m²   Wt Readings from Last 5 Encounters:   11/16/23 100.1 kg (220 lb 10.9 oz)   08/12/20 107 kg (236 lb)   10/22/19 106.3 kg (234 lb 5.6 oz)   12/03/18 106.1 kg (233 lb 14.5 oz)   09/17/18 110 kg (242 lb 8.1 oz)       ENT normal.  Neck supple. No adenopathy or thyromegaly. JOVANNY. Lungs are clear, good air entry, no wheezes, rhonchi or rales. S1 and S2 normal, no murmurs, regular rate and rhythm. Abdomen soft without tenderness, guarding, mass or organomegaly. Extremities show no edema, normal peripheral pulses. Neurological is normal, no focal findings.  Obesity noted.  Some arthritic change noted.    BREAST EXAM: deferred    PELVIC EXAM: deferred    ASSESSMENT:   1. Class 3 obesity    2. Controlled type 2 diabetes mellitus without complication, without long-term current use of insulin    3. Chronic obstructive pulmonary disease, unspecified COPD type          PLAN:   Counseled on age appropriate medical preventative services, including age appropriate cancer screenings, over all nutritional health, need for a consistent exercise regimen and an over all push towards maintaining a vigorous and active lifestyle.  Counseled on age appropriate vaccines and discussed upcoming health care needs based on age/gender.  Spent time with patient counseling on need for a good patient/doctor relationship moving forward.  Discussed use of common OTC medications and supplements.  Discussed common dietary aids and use of caffeine and the need for good sleep hygiene and stress management.    Problem List Items Addressed This Visit       Diabetes type 2, controlled    Current Assessment & Plan     The current medical " regimen is effective;  continue present plan and medications.  glp1RA         Chronic obstructive pulmonary disease    Current Assessment & Plan     The current medical regimen is effective;  continue present plan and medications.           Class 3 obesity - Primary    Current Assessment & Plan     The patient is asked to make an attempt to improve diet and exercise patterns to aid in medical management of this problem.  glp1RA            F/u in 1 year for wellness

## 2023-12-05 ENCOUNTER — TELEPHONE (OUTPATIENT)
Dept: FAMILY MEDICINE | Facility: CLINIC | Age: 73
End: 2023-12-05
Payer: MEDICARE

## 2023-12-05 DIAGNOSIS — E11.9 CONTROLLED TYPE 2 DIABETES MELLITUS WITHOUT COMPLICATION, WITHOUT LONG-TERM CURRENT USE OF INSULIN: Primary | ICD-10-CM

## 2023-12-05 NOTE — TELEPHONE ENCOUNTER
----- Message from Lito David sent at 12/5/2023 10:07 AM CST -----  Regarding: Self [phone]  Type: Patient Call Back    Who called: Self     What is the request in detail: called in regards to stating the pt is stating that the semaglutide (OZEMPIC) 1 mg/dose (4 mg/3 mL) is too strong and wants a lower dose.       [facility] - [place], [place] - [address]  [address] [address]  [place] [address]  Phone: [phone] Fax: [phone]     Can the clinic reply by [facility]? No     Would the patient rather a call back or a response via [facility]? Call back     Best call back number: [phone]     Additional Information:    Thank you.

## 2023-12-13 ENCOUNTER — TELEPHONE (OUTPATIENT)
Dept: FAMILY MEDICINE | Facility: CLINIC | Age: 73
End: 2023-12-13
Payer: MEDICARE

## 2023-12-13 DIAGNOSIS — K21.9 GASTROESOPHAGEAL REFLUX DISEASE, UNSPECIFIED WHETHER ESOPHAGITIS PRESENT: Primary | ICD-10-CM

## 2023-12-13 RX ORDER — OMEPRAZOLE 20 MG/1
CAPSULE, DELAYED RELEASE ORAL
Qty: 60 CAPSULE | Refills: 11 | Status: SHIPPED | OUTPATIENT
Start: 2023-12-13

## 2023-12-13 NOTE — TELEPHONE ENCOUNTER
----- Message from Jorge Pham sent at 12/13/2023 10:35 AM CST -----  Regarding: Suhda with [facility]  Type: RX Refill Request     Who Called:Sudha with [facility]       Have you contacted your pharmacy: Yes     Refill or New Rx:Refill      RX Name and Strength: omeprazole (PRILOSEC) 20 MG capsule    Preferred Pharmacy with phone number:.  [facility] - [facility] [address] [address]  [address] [address] [address]  Phone: [phone] Fax: [phone]    Local or Mail Order: Local     Ordering Provider: Dr. Phani Pal     Would the patient rather a call back or a response via [facility]?Callback      Best Call Back Number: [phone]      Additional Information: Stated that the patient is suppose to be taking this medication twice a day not once. Please reach out to the pharmacy.

## 2024-03-08 ENCOUNTER — LAB VISIT (OUTPATIENT)
Dept: LAB | Facility: HOSPITAL | Age: 74
End: 2024-03-08
Attending: FAMILY MEDICINE
Payer: MEDICARE

## 2024-03-08 DIAGNOSIS — Z00.00 ANNUAL PHYSICAL EXAM: ICD-10-CM

## 2024-03-08 DIAGNOSIS — R79.9 ABNORMAL FINDING OF BLOOD CHEMISTRY: ICD-10-CM

## 2024-03-08 LAB
ALBUMIN SERPL BCP-MCNC: 3.6 G/DL (ref 3.5–5.2)
ALP SERPL-CCNC: 65 U/L (ref 55–135)
ALT SERPL W/O P-5'-P-CCNC: 14 U/L (ref 10–44)
ANION GAP SERPL CALC-SCNC: 6 MMOL/L (ref 8–16)
AST SERPL-CCNC: 15 U/L (ref 10–40)
BASOPHILS # BLD AUTO: 0.07 K/UL (ref 0–0.2)
BASOPHILS NFR BLD: 0.8 % (ref 0–1.9)
BILIRUB SERPL-MCNC: 0.4 MG/DL (ref 0.1–1)
BILIRUB UR QL STRIP: NEGATIVE
BUN SERPL-MCNC: 6 MG/DL (ref 8–23)
CALCIUM SERPL-MCNC: 9.5 MG/DL (ref 8.7–10.5)
CHLORIDE SERPL-SCNC: 108 MMOL/L (ref 95–110)
CHOLEST SERPL-MCNC: 110 MG/DL (ref 120–199)
CHOLEST/HDLC SERPL: 3.3 {RATIO} (ref 2–5)
CLARITY UR: CLEAR
CO2 SERPL-SCNC: 25 MMOL/L (ref 23–29)
COLOR UR: YELLOW
CREAT SERPL-MCNC: 0.7 MG/DL (ref 0.5–1.4)
DIFFERENTIAL METHOD BLD: ABNORMAL
EOSINOPHIL # BLD AUTO: 0.3 K/UL (ref 0–0.5)
EOSINOPHIL NFR BLD: 2.8 % (ref 0–8)
ERYTHROCYTE [DISTWIDTH] IN BLOOD BY AUTOMATED COUNT: 16.5 % (ref 11.5–14.5)
EST. GFR  (NO RACE VARIABLE): >60 ML/MIN/1.73 M^2
GLUCOSE SERPL-MCNC: 84 MG/DL (ref 70–110)
GLUCOSE UR QL STRIP: NEGATIVE
HCT VFR BLD AUTO: 40.7 % (ref 37–48.5)
HDLC SERPL-MCNC: 33 MG/DL (ref 40–75)
HDLC SERPL: 30 % (ref 20–50)
HGB BLD-MCNC: 12.5 G/DL (ref 12–16)
HGB UR QL STRIP: NEGATIVE
IMM GRANULOCYTES # BLD AUTO: 0.04 K/UL (ref 0–0.04)
IMM GRANULOCYTES NFR BLD AUTO: 0.4 % (ref 0–0.5)
KETONES UR QL STRIP: NEGATIVE
LDLC SERPL CALC-MCNC: 61.2 MG/DL (ref 63–159)
LEUKOCYTE ESTERASE UR QL STRIP: NEGATIVE
LYMPHOCYTES # BLD AUTO: 2.9 K/UL (ref 1–4.8)
LYMPHOCYTES NFR BLD: 31 % (ref 18–48)
MCH RBC QN AUTO: 27.7 PG (ref 27–31)
MCHC RBC AUTO-ENTMCNC: 30.7 G/DL (ref 32–36)
MCV RBC AUTO: 90 FL (ref 82–98)
MONOCYTES # BLD AUTO: 0.6 K/UL (ref 0.3–1)
MONOCYTES NFR BLD: 6.5 % (ref 4–15)
NEUTROPHILS # BLD AUTO: 5.4 K/UL (ref 1.8–7.7)
NEUTROPHILS NFR BLD: 58.5 % (ref 38–73)
NITRITE UR QL STRIP: NEGATIVE
NONHDLC SERPL-MCNC: 77 MG/DL
NRBC BLD-RTO: 0 /100 WBC
PH UR STRIP: 7 [PH] (ref 5–8)
PLATELET # BLD AUTO: 258 K/UL (ref 150–450)
PMV BLD AUTO: 11 FL (ref 9.2–12.9)
POTASSIUM SERPL-SCNC: 4.6 MMOL/L (ref 3.5–5.1)
PROT SERPL-MCNC: 7.2 G/DL (ref 6–8.4)
PROT UR QL STRIP: NEGATIVE
RBC # BLD AUTO: 4.52 M/UL (ref 4–5.4)
SODIUM SERPL-SCNC: 139 MMOL/L (ref 136–145)
SP GR UR STRIP: 1.02 (ref 1–1.03)
TRIGL SERPL-MCNC: 79 MG/DL (ref 30–150)
URN SPEC COLLECT METH UR: NORMAL
UROBILINOGEN UR STRIP-ACNC: NEGATIVE EU/DL
WBC # BLD AUTO: 9.2 K/UL (ref 3.9–12.7)

## 2024-03-08 PROCEDURE — 85025 COMPLETE CBC W/AUTO DIFF WBC: CPT | Performed by: FAMILY MEDICINE

## 2024-03-08 PROCEDURE — 81003 URINALYSIS AUTO W/O SCOPE: CPT | Performed by: FAMILY MEDICINE

## 2024-03-08 PROCEDURE — 36415 COLL VENOUS BLD VENIPUNCTURE: CPT | Mod: PO | Performed by: FAMILY MEDICINE

## 2024-03-08 PROCEDURE — 83036 HEMOGLOBIN GLYCOSYLATED A1C: CPT | Performed by: FAMILY MEDICINE

## 2024-03-08 PROCEDURE — 80061 LIPID PANEL: CPT | Performed by: FAMILY MEDICINE

## 2024-03-08 PROCEDURE — 80053 COMPREHEN METABOLIC PANEL: CPT | Performed by: FAMILY MEDICINE

## 2024-03-09 LAB
ESTIMATED AVG GLUCOSE: 108 MG/DL (ref 68–131)
HBA1C MFR BLD: 5.4 % (ref 4–5.6)

## 2024-03-13 ENCOUNTER — OFFICE VISIT (OUTPATIENT)
Dept: FAMILY MEDICINE | Facility: CLINIC | Age: 74
End: 2024-03-13
Payer: MEDICARE

## 2024-03-13 VITALS
BODY MASS INDEX: 38.23 KG/M2 | HEART RATE: 78 BPM | SYSTOLIC BLOOD PRESSURE: 138 MMHG | TEMPERATURE: 98 F | OXYGEN SATURATION: 94 % | WEIGHT: 209 LBS | DIASTOLIC BLOOD PRESSURE: 82 MMHG

## 2024-03-13 DIAGNOSIS — E66.01 CLASS 3 OBESITY: ICD-10-CM

## 2024-03-13 DIAGNOSIS — E11.9 CONTROLLED TYPE 2 DIABETES MELLITUS WITHOUT COMPLICATION, WITHOUT LONG-TERM CURRENT USE OF INSULIN: Primary | ICD-10-CM

## 2024-03-13 DIAGNOSIS — Z12.31 OTHER SCREENING MAMMOGRAM: ICD-10-CM

## 2024-03-13 DIAGNOSIS — Z12.11 ENCOUNTER FOR COLORECTAL CANCER SCREENING: ICD-10-CM

## 2024-03-13 DIAGNOSIS — Z12.12 ENCOUNTER FOR COLORECTAL CANCER SCREENING: ICD-10-CM

## 2024-03-13 DIAGNOSIS — J44.9 CHRONIC OBSTRUCTIVE PULMONARY DISEASE, UNSPECIFIED COPD TYPE: ICD-10-CM

## 2024-03-13 PROCEDURE — 99999 PR PBB SHADOW E&M-EST. PATIENT-LVL III: CPT | Mod: PBBFAC,,, | Performed by: FAMILY MEDICINE

## 2024-03-13 PROCEDURE — 99214 OFFICE O/P EST MOD 30 MIN: CPT | Mod: S$PBB,,, | Performed by: FAMILY MEDICINE

## 2024-03-13 PROCEDURE — 99213 OFFICE O/P EST LOW 20 MIN: CPT | Mod: PBBFAC,PO | Performed by: FAMILY MEDICINE

## 2024-03-13 RX ORDER — MONTELUKAST SODIUM 10 MG/1
10 TABLET ORAL NIGHTLY
COMMUNITY

## 2024-03-13 RX ORDER — FLUTICASONE PROPIONATE AND SALMETEROL 250; 50 UG/1; UG/1
1 POWDER RESPIRATORY (INHALATION)
COMMUNITY

## 2024-03-13 NOTE — PROGRESS NOTES
HISTORY OF PRESENT ILLNESS:  Anju Benitez is a 73 y.o. female who presents to the clinic today for Annual Exam  .       She is doing great  No issues  Better since stopping the losartan  Had low b/p  Since then doing amazing  Walking well    Patient Active Problem List   Diagnosis    Asthma, not well controlled    Essential (primary) hypertension    Chronic allergic rhinitis    Hyperlipemia    Diabetes type 2, controlled    Chronic obstructive pulmonary disease    Breast mass    Class 3 obesity           CARE TEAM:  Patient Care Team:  Phani Pal MD as PCP - General (Family Medicine)  Og Lee III, MD (Inactive) as Consulting Physician (Cardiology)  Camden Whitfield MD as Consulting Physician (Pulmonary Disease)  Natali Brandt MD as Consulting Physician (Gynecology)  Ck Ruth II, MD as Consulting Physician (Gastroenterology)  Peña Yap MD as Consulting Physician (Ophthalmology)  Natali Brandt MD as Consulting Physician (Gynecology)         ROS  Per HPI  No new issues      PHYSICAL EXAM:   /82   Pulse 78   Temp 98.2 °F (36.8 °C) (Oral)   Wt 94.8 kg (208 lb 15.9 oz)   SpO2 (!) 94%   BMI 38.23 kg/m²   BP Readings from Last 5 Encounters:   03/13/24 138/82   11/16/23 118/71   08/12/20 130/60   12/03/18 120/84   09/17/18 120/72     Wt Readings from Last 5 Encounters:   03/13/24 94.8 kg (208 lb 15.9 oz)   11/16/23 100.1 kg (220 lb 10.9 oz)   08/12/20 107 kg (236 lb)   10/22/19 106.3 kg (234 lb 5.6 oz)   12/03/18 106.1 kg (233 lb 14.5 oz)             She appears well, in no apparent distress.  Alert and oriented times three, pleasant and cooperative. Vital signs are as documented in vital signs section.  Weight is down  The lungs are decreased in expiration  Some coarseness  Slight tachypnea  No hypoxia      Lab Visit on 03/08/2024   Component Date Value Ref Range Status    WBC 03/08/2024 9.20  3.90 - 12.70 K/uL Final    RBC 03/08/2024 4.52  4.00 - 5.40 M/uL Final     Hemoglobin 03/08/2024 12.5  12.0 - 16.0 g/dL Final    Hematocrit 03/08/2024 40.7  37.0 - 48.5 % Final    MCV 03/08/2024 90  82 - 98 fL Final    MCH 03/08/2024 27.7  27.0 - 31.0 pg Final    MCHC 03/08/2024 30.7 (L)  32.0 - 36.0 g/dL Final    RDW 03/08/2024 16.5 (H)  11.5 - 14.5 % Final    Platelets 03/08/2024 258  150 - 450 K/uL Final    MPV 03/08/2024 11.0  9.2 - 12.9 fL Final    Immature Granulocytes 03/08/2024 0.4  0.0 - 0.5 % Final    Gran # (ANC) 03/08/2024 5.4  1.8 - 7.7 K/uL Final    Immature Grans (Abs) 03/08/2024 0.04  0.00 - 0.04 K/uL Final    Comment: Mild elevation in immature granulocytes is non specific and   can be seen in a variety of conditions including stress response,   acute inflammation, trauma and pregnancy. Correlation with other   laboratory and clinical findings is essential.      Lymph # 03/08/2024 2.9  1.0 - 4.8 K/uL Final    Mono # 03/08/2024 0.6  0.3 - 1.0 K/uL Final    Eos # 03/08/2024 0.3  0.0 - 0.5 K/uL Final    Baso # 03/08/2024 0.07  0.00 - 0.20 K/uL Final    nRBC 03/08/2024 0  0 /100 WBC Final    Gran % 03/08/2024 58.5  38.0 - 73.0 % Final    Lymph % 03/08/2024 31.0  18.0 - 48.0 % Final    Mono % 03/08/2024 6.5  4.0 - 15.0 % Final    Eosinophil % 03/08/2024 2.8  0.0 - 8.0 % Final    Basophil % 03/08/2024 0.8  0.0 - 1.9 % Final    Differential Method 03/08/2024 Automated   Final    Sodium 03/08/2024 139  136 - 145 mmol/L Final    Potassium 03/08/2024 4.6  3.5 - 5.1 mmol/L Final    Chloride 03/08/2024 108  95 - 110 mmol/L Final    CO2 03/08/2024 25  23 - 29 mmol/L Final    Glucose 03/08/2024 84  70 - 110 mg/dL Final    BUN 03/08/2024 6 (L)  8 - 23 mg/dL Final    Creatinine 03/08/2024 0.7  0.5 - 1.4 mg/dL Final    Calcium 03/08/2024 9.5  8.7 - 10.5 mg/dL Final    Total Protein 03/08/2024 7.2  6.0 - 8.4 g/dL Final    Albumin 03/08/2024 3.6  3.5 - 5.2 g/dL Final    Total Bilirubin 03/08/2024 0.4  0.1 - 1.0 mg/dL Final    Comment: For infants and newborns, interpretation of results  should be based  on gestational age, weight and in agreement with clinical  observations.    Premature Infant recommended reference ranges:  Up to 24 hours.............<8.0 mg/dL  Up to 48 hours............<12.0 mg/dL  3-5 days..................<15.0 mg/dL  6-29 days.................<15.0 mg/dL      Alkaline Phosphatase 03/08/2024 65  55 - 135 U/L Final    AST 03/08/2024 15  10 - 40 U/L Final    ALT 03/08/2024 14  10 - 44 U/L Final    eGFR 03/08/2024 >60  >60 mL/min/1.73 m^2 Final    Anion Gap 03/08/2024 6 (L)  8 - 16 mmol/L Final    Specimen UA 03/08/2024 Urine, Clean Catch   Final    Color, UA 03/08/2024 Yellow  Yellow, Straw, Krista Final    Appearance, UA 03/08/2024 Clear  Clear Final    pH, UA 03/08/2024 7.0  5.0 - 8.0 Final    Specific Gravity, UA 03/08/2024 1.020  1.005 - 1.030 Final    Protein, UA 03/08/2024 Negative  Negative Final    Comment: Recommend a 24 hour urine protein or a urine   protein/creatinine ratio if globulin induced proteinuria is  clinically suspected.      Glucose, UA 03/08/2024 Negative  Negative Final    Ketones, UA 03/08/2024 Negative  Negative Final    Bilirubin (UA) 03/08/2024 Negative  Negative Final    Occult Blood UA 03/08/2024 Negative  Negative Final    Nitrite, UA 03/08/2024 Negative  Negative Final    Urobilinogen, UA 03/08/2024 Negative  <2.0 EU/dL Final    Leukocytes, UA 03/08/2024 Negative  Negative Final    Hemoglobin A1C 03/08/2024 5.4  4.0 - 5.6 % Final    Comment: ADA Screening Guidelines:  5.7-6.4%  Consistent with prediabetes  >or=6.5%  Consistent with diabetes    High levels of fetal hemoglobin interfere with the HbA1C  assay. Heterozygous hemoglobin variants (HbS, HgC, etc)do  not significantly interfere with this assay.   However, presence of multiple variants may affect accuracy.      Estimated Avg Glucose 03/08/2024 108  68 - 131 mg/dL Final    Cholesterol 03/08/2024 110 (L)  120 - 199 mg/dL Final    Comment: The National Cholesterol Education Program (NCEP) has  set the  following guidelines (reference ranges) for Cholesterol:  Optimal.....................<200 mg/dL  Borderline High.............200-239 mg/dL  High........................> or = 240 mg/dL      Triglycerides 03/08/2024 79  30 - 150 mg/dL Final    Comment: The National Cholesterol Education Program (NCEP) has set the  following guidelines (reference values) for triglycerides:  Normal......................<150 mg/dL  Borderline High.............150-199 mg/dL  High........................200-499 mg/dL      HDL 03/08/2024 33 (L)  40 - 75 mg/dL Final    Comment: The National Cholesterol Education Program (NCEP) has set the  following guidelines (reference values) for HDL Cholesterol:  Low...............<40 mg/dL  Optimal...........>60 mg/dL      LDL Cholesterol 03/08/2024 61.2 (L)  63.0 - 159.0 mg/dL Final    Comment: The National Cholesterol Education Program (NCEP) has set the  following guidelines (reference values) for LDL Cholesterol:  Optimal.......................<130 mg/dL  Borderline High...............130-159 mg/dL  High..........................160-189 mg/dL  Very High.....................>190 mg/dL      HDL/Cholesterol Ratio 03/08/2024 30.0  20.0 - 50.0 % Final    Total Cholesterol/HDL Ratio 03/08/2024 3.3  2.0 - 5.0 Final    Non-HDL Cholesterol 03/08/2024 77  mg/dL Final    Comment: Risk category and Non-HDL cholesterol goals:  Coronary heart disease (CHD)or equivalent (10-year risk of CHD >20%):  Non-HDL cholesterol goal     <130 mg/dL  Two or more CHD risk factors and 10-year risk of CHD <= 20%:  Non-HDL cholesterol goal     <160 mg/dL  0 to 1 CHD risk factor:  Non-HDL cholesterol goal     <190 mg/dL            Medication List with Changes/Refills   Current Medications    ALBUTEROL (VENTOLIN HFA) 90 MCG/ACTUATION INHALER    Inhale 2 puffs into the lungs every 6 (six) hours as needed for Wheezing. Rescue    ASPIRIN (ECOTRIN) 81 MG EC TABLET    Take 81 mg by mouth.    ATORVASTATIN (LIPITOR) 20 MG  TABLET    Take 20 mg by mouth.    CLOBETASOL 0.05% (TEMOVATE) 0.05 % OINT    1 application  2 (two) times daily.    DICYCLOMINE (BENTYL) 10 MG CAPSULE    take 1 CAPSULE(S) BY MOUTH TWICE DAILY AS NEEDED for stomach cramps    DILTIAZEM (DILACOR XR) 120 MG CDCR    TAKE 1 CAPSULE(S) BY MOUTH TWICE DAILY    EZETIMIBE (ZETIA) 10 MG TABLET    10 mg.     FLUTICASONE-SALMETEROL DISKUS INHALER 250-50 MCG    1 puff.    FLUTICASONE-SALMETEROL DISKUS INHALER 500-50 MCG    Inhale 1 puff into the lungs 2 (two) times daily.    IBUPROFEN (ADVIL,MOTRIN) 600 MG TABLET    Take 600 mg by mouth every 8 (eight) hours as needed.     LACTOBACILLUS RHAMNOSUS GG (CULTURELLE) 10 BILLION CELL CAPSULE    Take 1 capsule by mouth once daily.    LEVOCETIRIZINE (XYZAL) 5 MG TABLET    Take 5 mg by mouth.    LOSARTAN (COZAAR) 50 MG TABLET    Take 25 mg by mouth once daily.    MAGNESIUM, ALUMINUM HYDROXIDE (MAG-AL ORAL)    Take 400 mg by mouth once daily.    METFORMIN (GLUCOPHAGE) 500 MG TABLET    Take 500 mg by mouth 2 (two) times daily with meals.     MONTELUKAST (SINGULAIR) 10 MG TABLET    Take 10 mg by mouth every evening.    OMEPRAZOLE (PRILOSEC) 20 MG CAPSULE    TAKE 1 CAPSULE(S) BY MOUTH TWICE a day    SEMAGLUTIDE (OZEMPIC) 0.25 MG OR 0.5 MG (2 MG/3 ML) PEN INJECTOR    Inject 0.5 mg into the skin every 7 days.    SPIRONOLACTONE (ALDACTONE) 25 MG TABLET    Take 12.5 mg by mouth once daily.   Discontinued Medications    SEMAGLUTIDE (OZEMPIC) 1 MG/DOSE (4 MG/3 ML)    Inject 1 mg into the skin every 7 days.         ASSESSMENT AND PLAN:    Problem List Items Addressed This Visit       Diabetes type 2, controlled - Primary    Current Assessment & Plan     A1C is controlled  Continue glp1RA           Chronic obstructive pulmonary disease    Current Assessment & Plan     94% noted    Stable currently  Better with the weight loss  Continue rx         Class 3 obesity    Current Assessment & Plan     The patient is asked to make an attempt to improve diet  and exercise patterns to aid in medical management of this problem.  glp1RA          Other Visit Diagnoses       Other screening mammogram        Relevant Orders    Mammo Digital Screening Bilat w/ Eyal    Encounter for colorectal cancer screening        Relevant Orders    Cologuard Screening (Multitarget Stool DNA)              Future Appointments   Date Time Provider Department Center   9/4/2024 10:00 AM Phani Pal MD [facility]         Follow up in about 6 months (around 9/13/2024) for diabetes management. or sooner as needed.

## 2024-03-14 NOTE — PATIENT INSTRUCTIONS
"Diabetes Management Status    Statin: Taking  ACE/ARB: Taking    Screening or Prevention Patient's value Goal Complete/Controlled?   HgA1C Testing and Control   Lab Results   Component Value Date    HGBA1C 5.4 03/08/2024      Annually/Less than 8% Yes   Lipid profile : 03/08/2024 Annually Yes   LDL control Lab Results   Component Value Date    LDLCALC 61.2 (L) 03/08/2024    Annually/Less than 100 mg/dl  Yes   Nephropathy screening Lab Results   Component Value Date    LABMICR 8.0 09/12/2018     Lab Results   Component Value Date    PROTEINUA Negative 03/08/2024     No results found for: "UTPCR"   Annually Yes   Blood pressure BP Readings from Last 1 Encounters:   03/13/24 138/82    Less than 140/90 Yes   Dilated retinal exam Most Recent Eye Exam Date: Not Found Annually No   Foot exam   Most Recent Foot Exam Date: Not Found Annually No      "

## 2024-04-01 LAB — NONINV COLON CA DNA+OCC BLD SCRN STL QL: NORMAL

## 2024-04-07 LAB — NONINV COLON CA DNA+OCC BLD SCRN STL QL: NEGATIVE

## 2024-09-04 ENCOUNTER — OFFICE VISIT (OUTPATIENT)
Dept: FAMILY MEDICINE | Facility: CLINIC | Age: 74
End: 2024-09-04
Payer: MEDICARE

## 2024-09-04 VITALS
DIASTOLIC BLOOD PRESSURE: 86 MMHG | TEMPERATURE: 99 F | HEART RATE: 69 BPM | BODY MASS INDEX: 35.75 KG/M2 | WEIGHT: 201.75 LBS | HEIGHT: 63 IN | OXYGEN SATURATION: 93 % | SYSTOLIC BLOOD PRESSURE: 136 MMHG

## 2024-09-04 DIAGNOSIS — J45.909 ASTHMA, NOT WELL CONTROLLED, UNSPECIFIED ASTHMA SEVERITY, UNSPECIFIED WHETHER COMPLICATED, UNSPECIFIED WHETHER PERSISTENT: Primary | ICD-10-CM

## 2024-09-04 DIAGNOSIS — E11.9 CONTROLLED TYPE 2 DIABETES MELLITUS WITHOUT COMPLICATION, WITHOUT LONG-TERM CURRENT USE OF INSULIN: ICD-10-CM

## 2024-09-04 DIAGNOSIS — J44.9 CHRONIC OBSTRUCTIVE PULMONARY DISEASE, UNSPECIFIED COPD TYPE: ICD-10-CM

## 2024-09-04 PROCEDURE — 99999 PR PBB SHADOW E&M-EST. PATIENT-LVL V: CPT | Mod: PBBFAC,,, | Performed by: FAMILY MEDICINE

## 2024-09-04 PROCEDURE — 99215 OFFICE O/P EST HI 40 MIN: CPT | Mod: PBBFAC,PO | Performed by: FAMILY MEDICINE

## 2024-09-04 PROCEDURE — 99214 OFFICE O/P EST MOD 30 MIN: CPT | Mod: S$PBB,,, | Performed by: FAMILY MEDICINE

## 2024-09-04 PROCEDURE — G2211 COMPLEX E/M VISIT ADD ON: HCPCS | Mod: S$PBB,,, | Performed by: FAMILY MEDICINE

## 2024-09-04 RX ORDER — FLUTICASONE PROPIONATE AND SALMETEROL 250; 50 UG/1; UG/1
1 POWDER RESPIRATORY (INHALATION) 2 TIMES DAILY
Qty: 60 EACH | Refills: 11 | Status: SHIPPED | OUTPATIENT
Start: 2024-09-04

## 2024-09-04 NOTE — PROGRESS NOTES
Chief Complaint   Patient presents with    Follow-up       SUBJECTIVE:   73 y.o. female for annual routine checkup.    Current Outpatient Medications   Medication Sig Dispense Refill    albuterol (VENTOLIN HFA) 90 mcg/actuation inhaler Inhale 2 puffs into the lungs every 6 (six) hours as needed for Wheezing. Rescue      aspirin (ECOTRIN) 81 MG EC tablet Take 81 mg by mouth.      atorvastatin (LIPITOR) 20 MG tablet Take 20 mg by mouth.      clobetasol 0.05% (TEMOVATE) 0.05 % Oint 1 application  2 (two) times daily.      dicyclomine (BENTYL) 10 MG capsule take 1 CAPSULE(S) BY MOUTH TWICE DAILY AS NEEDED for stomach cramps  0    diltiaZEM (DILACOR XR) 120 MG CDCR TAKE 1 CAPSULE(S) BY MOUTH TWICE DAILY  4    ezetimibe (ZETIA) 10 mg tablet 10 mg.       fluticasone-salmeterol diskus inhaler 250-50 mcg Inhale 1 puff into the lungs 2 (two) times a day. 60 each 11    fluticasone-salmeterol diskus inhaler 500-50 mcg Inhale 1 puff into the lungs 2 (two) times daily.      ibuprofen (ADVIL,MOTRIN) 600 MG tablet Take 600 mg by mouth every 8 (eight) hours as needed.       Lactobacillus rhamnosus GG (CULTURELLE) 10 billion cell capsule Take 1 capsule by mouth once daily.      levocetirizine (XYZAL) 5 MG tablet Take 5 mg by mouth.      losartan (COZAAR) 50 MG tablet Take 25 mg by mouth once daily.  4    magnesium, aluminum hydroxide (MAG-AL ORAL) Take 400 mg by mouth once daily.      metFORMIN (GLUCOPHAGE) 500 MG tablet Take 500 mg by mouth 2 (two) times daily with meals.       montelukast (SINGULAIR) 10 mg tablet Take 10 mg by mouth every evening.      omeprazole (PRILOSEC) 20 MG capsule TAKE 1 CAPSULE(S) BY MOUTH TWICE a day 60 capsule 11    semaglutide (OZEMPIC) 0.25 mg or 0.5 mg (2 mg/3 mL) pen injector Inject 0.5 mg into the skin every 7 days. 3 mL 11    spironolactone (ALDACTONE) 25 MG tablet Take 12.5 mg by mouth once daily.  10     No current facility-administered medications for this visit.     Allergies: Amoxicillin,  "Ciprofloxacin, Penicillins, and Oxycodone   No LMP recorded. Patient is postmenopausal.    ROS:  Feeling well. No dyspnea or chest pain on exertion.  No abdominal pain, change in bowel habits, black or bloody stools.  No urinary tract symptoms. GYN ROS: . No neurological complaints.    OBJECTIVE:   The patient appears well, alert, oriented x 3, in no distress.  /86   Pulse 69   Temp 99 °F (37.2 °C) (Oral)   Ht 5' 3" (1.6 m)   Wt 91.5 kg (201 lb 11.5 oz)   SpO2 (!) 93%   BMI 35.73 kg/m²   Wt Readings from Last 5 Encounters:   09/04/24 91.5 kg (201 lb 11.5 oz)   03/13/24 94.8 kg (208 lb 15.9 oz)   11/16/23 100.1 kg (220 lb 10.9 oz)   08/12/20 107 kg (236 lb)   10/22/19 106.3 kg (234 lb 5.6 oz)       ENT normal.  Neck supple. No adenopathy or thyromegaly. JOVANNY. Lungs are clear, good air entry, no wheezes, rhonchi or rales. S1 and S2 normal, no murmurs, regular rate and rhythm. Abdomen soft without tenderness, guarding, mass or organomegaly. Extremities show no edema, normal peripheral pulses. Neurological is normal, no focal findings.  Weight loss is coming along well    BREAST EXAM: deferred    PELVIC EXAM: deferred    ASSESSMENT:   1. Asthma, not well controlled, unspecified asthma severity, unspecified whether complicated, unspecified whether persistent    2. Chronic obstructive pulmonary disease, unspecified COPD type    3. Controlled type 2 diabetes mellitus without complication, without long-term current use of insulin          PLAN:   Counseled on age appropriate medical preventative services, including age appropriate cancer screenings, over all nutritional health, need for a consistent exercise regimen and an over all push towards maintaining a vigorous and active lifestyle.  Counseled on age appropriate vaccines and discussed upcoming health care needs based on age/gender.  Spent time with patient counseling on need for a good patient/doctor relationship moving forward.  Discussed use of common " OTC medications and supplements.  Discussed common dietary aids and use of caffeine and the need for good sleep hygiene and stress management.    Problem List Items Addressed This Visit       Asthma, not well controlled - Primary    Relevant Medications    fluticasone-salmeterol diskus inhaler 250-50 mcg    Diabetes type 2, controlled    Current Assessment & Plan     Continue glp1RA  Potential medication side effects were discussed with the patient; let me know if any occur.           Chronic obstructive pulmonary disease    Relevant Medications    fluticasone-salmeterol diskus inhaler 250-50 mcg       F/u in 1 year for wellness

## 2024-09-06 NOTE — ASSESSMENT & PLAN NOTE
Continue glp1RA  Potential medication side effects were discussed with the patient; let me know if any occur.

## 2024-11-08 DIAGNOSIS — Z12.31 OTHER SCREENING MAMMOGRAM: Primary | ICD-10-CM

## 2024-11-21 DIAGNOSIS — E11.9 CONTROLLED TYPE 2 DIABETES MELLITUS WITHOUT COMPLICATION, WITHOUT LONG-TERM CURRENT USE OF INSULIN: ICD-10-CM

## 2024-11-21 NOTE — TELEPHONE ENCOUNTER
Care Due:                  Date            Visit Type   Department     Provider  --------------------------------------------------------------------------------                                [facility] FAMILY                              PRIMARY      MEDICINE/INTERN  Last Visit: 09-      CARE ([facility])   ELENA Pal                              [facility]                              PRIMARY      MEDICINE/INTERN  Next Visit: 03-      CARE ([facility])   AL DINO Pal                                                            Last  Test          Frequency    Reason                     Performed    Due Date  --------------------------------------------------------------------------------    HBA1C.......  6 months...  semaglutide..............  03-   09-    [facility] Embedded Care Due Messages. Reference number: [id number].   11/21/2024 10:20:42 AM CST

## 2024-11-21 NOTE — TELEPHONE ENCOUNTER
Refill Routing Note   Medication(s) are not appropriate for processing by [facility] for the following reason(s):             [facility] action(s):  Defer   Requires labs : Yes - A1c            Appointments  past 12m or future 3m with PCP    Date Provider   Last Visit   9/4/2024 Phani Pal MD   Next Visit   3/6/2025 Phani Pal MD   ED visits in past 90 days: 0        Note composed:3:17 PM 11/21/2024

## 2024-11-24 RX ORDER — SEMAGLUTIDE 0.68 MG/ML
0.5 INJECTION, SOLUTION SUBCUTANEOUS WEEKLY
Qty: 9 ML | Refills: 0 | Status: SHIPPED | OUTPATIENT
Start: 2024-11-24

## 2024-12-09 LAB — BCS RECOMMENDATION EXT: NORMAL

## 2024-12-18 ENCOUNTER — PATIENT OUTREACH (OUTPATIENT)
Dept: ADMINISTRATIVE | Facility: HOSPITAL | Age: 74
End: 2024-12-18
Payer: MEDICARE

## 2025-02-04 ENCOUNTER — PATIENT OUTREACH (OUTPATIENT)
Dept: ADMINISTRATIVE | Facility: HOSPITAL | Age: 75
End: 2025-02-04
Payer: MEDICARE

## 2025-02-04 DIAGNOSIS — N95.9 MENOPAUSAL DISORDER: Primary | ICD-10-CM

## 2025-02-04 NOTE — LETTER
AUTHORIZATION FOR RELEASE OF   CONFIDENTIAL INFORMATION      We are seeing Anju Benitez, date of birth 1950, in the clinic at [facility] FAMILY MEDICINE/INTERNAL MED. Phani Pal MD is the patient's PCP. Anju Benitez has an outstanding lab/procedure at the time we reviewed her chart. In order to help keep her health information updated, she has authorized us to request the following medical record(s):        (  )  MAMMOGRAM                                      (  )  COLONOSCOPY      (  )  PAP SMEAR                                          (  )  OUTSIDE LAB RESULTS     (  )  DEXA SCAN                                          ( xx )  EYE EXAM            (  )  FOOT EXAM                                          (  )  ENTIRE RECORD     (  )  OUTSIDE IMMUNIZATIONS                 (  )  _______________         Please fax records to [facility], Rodi, Jake J., MD, [phone]      Patient Name: Anju Benitez  : 1950  Patient Phone #: [phone]

## 2025-02-04 NOTE — PROGRESS NOTES
VBHM Score: 6     Colon Cancer Screening  Osteoporosis Screening  Urine Screening  Eye Exam  Hemoglobin A1c  Foot Exam    Pneumonia Vaccine  Tetanus Vaccine            Spoke with Ms. Rene reviewed SDOH  No concerns or needs at this time  Colorectal cancer screen Cologvickierd resulted 2024)  TYRONE sent for eye exam  Labs on next visit  Dexa scan scheduled

## 2025-02-10 LAB
ALBUMIN SERPL BCP-MCNC: 3.9 G/DL
ALBUMIN/GLOB SERPL: 1.3 {RATIO}
ALP SERPL-CCNC: 64 U/L (ref 25–125)
ALT SERPL W P-5'-P-CCNC: 13 U/L (ref 7–35)
AST SERPL-CCNC: 18 U/L (ref 13–35)
BILIRUB SERPL-MCNC: 0 MG/DL (ref 0.1–1.4)
BUN SERPL-MCNC: 10 MG/DL
CALCIUM SERPL-MCNC: 9.7 MG/DL
CHLORIDE SERPL-SCNC: 102 MMOL/L (ref 99–108)
CHOL/HDLC RATIO: 2.8
CHOLEST SERPL-MSCNC: 110 MG/DL (ref 0–200)
CO2 SERPL-SCNC: 26 MMOL/L
CREAT SERPL-MCNC: 0.6 MG/DL
EGFR: 97.5
GLOBULIN SER-MCNC: 2.9 G/DL
GLUCOSE SERPL-MCNC: 92 MG/DL
HBA1C MFR BLD: 5.4 % (ref 4–6)
HDLC SERPL-MCNC: 39 MG/DL (ref 35–70)
LDL CHOLESTEROL DIRECT: 66 MG/DL
NONHDLC SERPL-MCNC: 70 MG/DL
POTASSIUM BLOOD: 4.5
PROT SERPL-MCNC: 6.8 G/DL
SODIUM SERPL-SCNC: 138 MMOL/L
TRIGL SERPL-MCNC: 81 MG/DL (ref 40–160)
VLDL CHOLESTEROL: 16 MG/DL

## 2025-02-18 DIAGNOSIS — E11.9 CONTROLLED TYPE 2 DIABETES MELLITUS WITHOUT COMPLICATION, WITHOUT LONG-TERM CURRENT USE OF INSULIN: ICD-10-CM

## 2025-02-18 NOTE — TELEPHONE ENCOUNTER
Care Due:                  Date            Visit Type   Department     Provider  --------------------------------------------------------------------------------                                [facility] FAMILY                              PRIMARY      MEDICINE/INTERN  Last Visit: 09-      CARE ([facility])   [facility]         Phani Pal  Next Visit: None Scheduled  None         None Found                                                            Last  Test          Frequency    Reason                     Performed    Due Date  --------------------------------------------------------------------------------    HBA1C.......  6 months...  OZEMPIC..................  03-   09-    [employer] Embedded Care Due Messages. Reference number: [id number].   2/18/2025 11:38:17 AM CST

## 2025-02-19 DIAGNOSIS — E11.9 TYPE 2 DIABETES MELLITUS WITHOUT COMPLICATION: ICD-10-CM

## 2025-02-19 RX ORDER — SEMAGLUTIDE 0.68 MG/ML
0.5 INJECTION, SOLUTION SUBCUTANEOUS
Qty: 9 ML | Refills: 3 | Status: SHIPPED | OUTPATIENT
Start: 2025-02-19

## 2025-02-19 NOTE — TELEPHONE ENCOUNTER
Refill Routing Note   Medication(s) are not appropriate for processing by [facility] for the following reason(s):        Required labs outdated    [facility] action(s):  Defer     Requires labs : Yes           Appointments  past 12m or future 3m with PCP    Date Provider   Last Visit   9/4/2024 Phani Pal MD   Next Visit   3/6/2025 Phani Pal MD   ED visits in past 90 days: 0        Note composed:10:41 PM 02/18/2025

## 2025-02-21 DIAGNOSIS — Z00.00 ENCOUNTER FOR MEDICARE ANNUAL WELLNESS EXAM: ICD-10-CM

## 2025-02-26 DIAGNOSIS — E11.9 TYPE 2 DIABETES MELLITUS WITHOUT COMPLICATION: ICD-10-CM

## 2025-02-27 ENCOUNTER — HOSPITAL ENCOUNTER (OUTPATIENT)
Dept: RADIOLOGY | Facility: CLINIC | Age: 75
Discharge: HOME | End: 2025-02-27
Payer: MEDICARE

## 2025-02-27 ENCOUNTER — TELEPHONE (OUTPATIENT)
Dept: FAMILY MEDICINE | Facility: CLINIC | Age: 75
End: 2025-02-27
Payer: MEDICARE

## 2025-02-27 DIAGNOSIS — N95.9 MENOPAUSAL DISORDER: ICD-10-CM

## 2025-02-27 PROCEDURE — 77080 DXA BONE DENSITY AXIAL: CPT | Mod: 26,,, | Performed by: INTERNAL MEDICINE

## 2025-02-27 PROCEDURE — 77080 DXA BONE DENSITY AXIAL: CPT | Mod: TC,PO

## 2025-02-27 NOTE — TELEPHONE ENCOUNTER
----- Message from Mark sent at 2/27/2025  9:43 AM CST -----  Who called:Tanner is the request in detail: would like to know if she can come in around 1100am she stay in [place] and that appt time is too early Can the clinic reply by [facility]?no Would the patient rather a call back or a response via [facility] call back number: [phone] Information:

## 2025-02-28 ENCOUNTER — RESULTS FOLLOW-UP (OUTPATIENT)
Dept: FAMILY MEDICINE | Facility: CLINIC | Age: 75
End: 2025-02-28

## 2025-03-06 ENCOUNTER — PATIENT MESSAGE (OUTPATIENT)
Dept: ADMINISTRATIVE | Facility: HOSPITAL | Age: 75
End: 2025-03-06
Payer: MEDICARE

## 2025-03-06 ENCOUNTER — OFFICE VISIT (OUTPATIENT)
Dept: FAMILY MEDICINE | Facility: CLINIC | Age: 75
End: 2025-03-06
Payer: MEDICARE

## 2025-03-06 VITALS
DIASTOLIC BLOOD PRESSURE: 70 MMHG | BODY MASS INDEX: 35.62 KG/M2 | WEIGHT: 201.06 LBS | OXYGEN SATURATION: 95 % | SYSTOLIC BLOOD PRESSURE: 134 MMHG | HEIGHT: 63 IN | TEMPERATURE: 98 F | HEART RATE: 60 BPM

## 2025-03-06 DIAGNOSIS — J30.9 CHRONIC ALLERGIC RHINITIS: Primary | ICD-10-CM

## 2025-03-06 DIAGNOSIS — E66.01 SEVERE OBESITY (BMI 35.0-35.9 WITH COMORBIDITY): ICD-10-CM

## 2025-03-06 DIAGNOSIS — E11.9 CONTROLLED TYPE 2 DIABETES MELLITUS WITHOUT COMPLICATION, WITHOUT LONG-TERM CURRENT USE OF INSULIN: ICD-10-CM

## 2025-03-06 DIAGNOSIS — J44.9 CHRONIC OBSTRUCTIVE PULMONARY DISEASE, UNSPECIFIED COPD TYPE: ICD-10-CM

## 2025-03-06 PROCEDURE — 99214 OFFICE O/P EST MOD 30 MIN: CPT | Mod: PBBFAC,PO | Performed by: FAMILY MEDICINE

## 2025-03-06 PROCEDURE — G2211 COMPLEX E/M VISIT ADD ON: HCPCS | Mod: S$PBB,,, | Performed by: FAMILY MEDICINE

## 2025-03-06 PROCEDURE — 99999 PR PBB SHADOW E&M-EST. PATIENT-LVL IV: CPT | Mod: PBBFAC,,, | Performed by: FAMILY MEDICINE

## 2025-03-06 PROCEDURE — 99214 OFFICE O/P EST MOD 30 MIN: CPT | Mod: S$PBB,,, | Performed by: FAMILY MEDICINE

## 2025-03-06 RX ORDER — MONTELUKAST SODIUM 10 MG/1
10 TABLET ORAL NIGHTLY
Qty: 90 TABLET | Refills: 1 | Status: SHIPPED | OUTPATIENT
Start: 2025-03-06

## 2025-03-06 RX ORDER — IPRATROPIUM BROMIDE AND ALBUTEROL SULFATE 2.5; .5 MG/3ML; MG/3ML
SOLUTION RESPIRATORY (INHALATION) EVERY 6 HOURS
COMMUNITY
Start: 2024-09-18

## 2025-03-06 RX ORDER — DILTIAZEM HYDROCHLORIDE 120 MG/1
CAPSULE, EXTENDED RELEASE ORAL
COMMUNITY
Start: 2025-01-07

## 2025-03-06 NOTE — PROGRESS NOTES
"Chief Complaint   Patient presents with    Diabetes     6 month check up       SUBJECTIVE:   74 y.o. female for annual routine checkup.    Current Medications[1]  Allergies: Amoxicillin, Ciprofloxacin, Penicillins, and Oxycodone   No LMP recorded. Patient is postmenopausal.    ROS:  Feeling well. No dyspnea or chest pain on exertion.  No abdominal pain, change in bowel habits, black or bloody stools.  No urinary tract symptoms. GYN ROS: she had some exertional chest pressure/SoB, recovered in 5 minutes without issue. No neurological complaints.    OBJECTIVE:   The patient appears well, alert, oriented x 3, in no distress.  /70   Pulse 60   Temp 97.8 °F (36.6 °C) (Oral)   Ht 5' 3" (1.6 m)   Wt 91.2 kg (201 lb 1 oz)   SpO2 95%   BMI 35.62 kg/m²   Wt Readings from Last 5 Encounters:   03/06/25 91.2 kg (201 lb 1 oz)   09/04/24 91.5 kg (201 lb 11.5 oz)   03/13/24 94.8 kg (208 lb 15.9 oz)   11/16/23 100.1 kg (220 lb 10.9 oz)   08/12/20 107 kg (236 lb)       ENT abnormal.  Neck supple. No adenopathy or thyromegaly. JOVANNY. Lungs are clear, good air entry, no wheezes, rhonchi or rales. S1 and S2 normal, no murmurs, regular rate and rhythm. Abdomen soft without tenderness, guarding, mass or organomegaly. Extremities show no edema, normal peripheral pulses. Neurological is normal, no focal findings.  Foot exam negative  Got her new labs   Will place in media    BREAST EXAM: deferred    PELVIC EXAM: deferred    ASSESSMENT:   1. Chronic allergic rhinitis    2. Severe obesity (BMI 35.0-35.9 with comorbidity)    3. Chronic obstructive pulmonary disease, unspecified COPD type    4. Controlled type 2 diabetes mellitus without complication, without long-term current use of insulin          PLAN:   Counseled on age appropriate medical preventative services, including age appropriate cancer screenings, over all nutritional health, need for a consistent exercise regimen and an over all push towards maintaining a vigorous and " active lifestyle.  Counseled on age appropriate vaccines and discussed upcoming health care needs based on age/gender.  Spent time with patient counseling on need for a good patient/doctor relationship moving forward.  Discussed use of common OTC medications and supplements.  Discussed common dietary aids and use of caffeine and the need for good sleep hygiene and stress management.    Problem List Items Addressed This Visit       Chronic allergic rhinitis - Primary    Relevant Medications    montelukast (SINGULAIR) 10 mg tablet    Diabetes type 2, controlled    Current Assessment & Plan   A1C 5.4  Doing great  Continue glp1RA         Chronic obstructive pulmonary disease    Current Assessment & Plan   Stable with the weight loss  Continue inhaler  Watch her allergies  Add in singulair         Severe obesity (BMI 35.0-35.9 with comorbidity)    Current Assessment & Plan   Steady improvement  No longer morbid obese          Mainly sedentary  She will try to get a little more active  Cologlaura was fine  She will see Dr. White for the eye exam  F/u in 1 year for wellness         [1]   Current Outpatient Medications   Medication Sig Dispense Refill    albuterol (VENTOLIN HFA) 90 mcg/actuation inhaler Inhale 2 puffs into the lungs every 6 (six) hours as needed for Wheezing. Rescue      albuterol-ipratropium (DUO-NEB) 2.5 mg-0.5 mg/3 mL nebulizer solution Take by nebulization every 6 (six) hours.      aspirin (ECOTRIN) 81 MG EC tablet Take 81 mg by mouth.      atorvastatin (LIPITOR) 20 MG tablet Take 20 mg by mouth.      clobetasol 0.05% (TEMOVATE) 0.05 % Oint 1 application  2 (two) times daily.      dicyclomine (BENTYL) 10 MG capsule take 1 CAPSULE(S) BY MOUTH TWICE DAILY AS NEEDED for stomach cramps  0    ezetimibe (ZETIA) 10 mg tablet 10 mg.       fluticasone-salmeterol diskus inhaler 250-50 mcg Inhale 1 puff into the lungs 2 (two) times a day. 60 each 11    fluticasone-salmeterol diskus inhaler 500-50 mcg Inhale 1  puff into the lungs 2 (two) times daily.      ibuprofen (ADVIL,MOTRIN) 600 MG tablet Take 600 mg by mouth every 8 (eight) hours as needed.       Lactobacillus rhamnosus GG (CULTURELLE) 10 billion cell capsule Take 1 capsule by mouth once daily.      levocetirizine (XYZAL) 5 MG tablet Take 5 mg by mouth.      losartan (COZAAR) 50 MG tablet Take 25 mg by mouth once daily.  4    magnesium, aluminum hydroxide (MAG-AL ORAL) Take 400 mg by mouth once daily.      metFORMIN (GLUCOPHAGE) 500 MG tablet Take 500 mg by mouth 2 (two) times daily with meals.       omeprazole (PRILOSEC) 20 MG capsule TAKE 1 CAPSULE(S) BY MOUTH TWICE a day 60 capsule 11    semaglutide (OZEMPIC) 0.25 mg or 0.5 mg (2 mg/3 mL) pen injector Inject 0.5 mg into the skin every 7 days. 9 mL 3    TIADYLT  mg 24 hr capsule Take by mouth.      montelukast (SINGULAIR) 10 mg tablet Take 1 tablet (10 mg total) by mouth every evening. 90 tablet 1     No current facility-administered medications for this visit.

## 2025-03-10 ENCOUNTER — PATIENT OUTREACH (OUTPATIENT)
Dept: ADMINISTRATIVE | Facility: HOSPITAL | Age: 75
End: 2025-03-10
Payer: MEDICARE

## 2025-03-11 ENCOUNTER — RESULTS FOLLOW-UP (OUTPATIENT)
Dept: FAMILY MEDICINE | Facility: CLINIC | Age: 75
End: 2025-03-11

## 2025-03-24 DIAGNOSIS — E11.9 CONTROLLED TYPE 2 DIABETES MELLITUS WITHOUT COMPLICATION, WITHOUT LONG-TERM CURRENT USE OF INSULIN: ICD-10-CM

## 2025-03-24 NOTE — TELEPHONE ENCOUNTER
No care due was identified.  [facility] Embedded Care Due Messages. Reference number: [id number].   3/24/2025 11:01:11 AM CDT

## 2025-03-25 RX ORDER — SEMAGLUTIDE 0.68 MG/ML
0.5 INJECTION, SOLUTION SUBCUTANEOUS
Qty: 9 ML | Refills: 1 | Status: SHIPPED | OUTPATIENT
Start: 2025-03-25

## 2025-03-25 NOTE — TELEPHONE ENCOUNTER
Refill Decision Note   Anju Benitez  is requesting a refill authorization.  Brief Assessment and Rationale for Refill:  Approve     Medication Therapy Plan:         Comments:     Note composed:6:29 AM 03/25/2025

## 2025-04-23 DIAGNOSIS — J44.9 CHRONIC OBSTRUCTIVE PULMONARY DISEASE, UNSPECIFIED COPD TYPE: ICD-10-CM

## 2025-04-23 DIAGNOSIS — J45.909 ASTHMA, NOT WELL CONTROLLED, UNSPECIFIED ASTHMA SEVERITY, UNSPECIFIED WHETHER COMPLICATED, UNSPECIFIED WHETHER PERSISTENT: Primary | ICD-10-CM

## 2025-05-03 ENCOUNTER — OFFICE VISIT (OUTPATIENT)
Dept: FAMILY MEDICINE | Facility: CLINIC | Age: 75
End: 2025-05-03
Payer: MEDICARE

## 2025-05-03 DIAGNOSIS — E11.9 CONTROLLED TYPE 2 DIABETES MELLITUS WITHOUT COMPLICATION, WITHOUT LONG-TERM CURRENT USE OF INSULIN: Primary | ICD-10-CM

## 2025-05-03 DIAGNOSIS — I10 ESSENTIAL (PRIMARY) HYPERTENSION: ICD-10-CM

## 2025-05-03 DIAGNOSIS — R06.2 WHEEZING: ICD-10-CM

## 2025-05-03 DIAGNOSIS — J32.9 RECURRENT SINUS INFECTIONS: ICD-10-CM

## 2025-05-03 PROCEDURE — 99214 OFFICE O/P EST MOD 30 MIN: CPT | Mod: S$PBB,,, | Performed by: FAMILY MEDICINE

## 2025-05-03 PROCEDURE — G2211 COMPLEX E/M VISIT ADD ON: HCPCS | Mod: S$PBB,,, | Performed by: FAMILY MEDICINE

## 2025-05-03 RX ORDER — PREDNISONE 5 MG/1
TABLET ORAL
Qty: 42 TABLET | Refills: 0 | Status: SHIPPED | OUTPATIENT
Start: 2025-05-03 | End: 2025-05-24

## 2025-05-03 RX ORDER — FLUCONAZOLE 200 MG/1
200 TABLET ORAL WEEKLY
Qty: 4 TABLET | Refills: 0 | Status: SHIPPED | OUTPATIENT
Start: 2025-05-03 | End: 2025-06-02

## 2025-05-03 NOTE — PROGRESS NOTES
HISTORY OF PRESENT ILLNESS:  Anju Benitez is a 74 y.o. female who presents to the clinic today for No chief complaint on file.  .       No polyuria, polydipsia, blurry vision, chest pain, dyspnea or claudication.  No foot burning, numbness or pain. Taking medication compliantly without noted sided effects. Follows diet fairly well. Home glucose monitoring in the range of 120.  Has seen diabetic educator. Last eye exam iswithin the year was reportedly negative. Last foot exam negative within the past year.  She is having sinus trouble and cough  Long standing    Problem List[1]        CARE TEAM:  Patient Care Team:  Phani Pal MD as PCP - General (Family Medicine)  Og Lee III, MD (Inactive) as Consulting Physician (Cardiology)  Camden Whitfield MD as Consulting Physician (Pulmonary Disease)  Natali Brandt MD as Consulting Physician (Gynecology)  Ck Ruth II, MD as Consulting Physician (Gastroenterology)  Peña Yap MD as Consulting Physician (Ophthalmology)  Natali Brandt MD as Consulting Physician (Gynecology)  Kaya Vaz LPN as Care Coordinator         ROS        PHYSICAL EXAM:   There were no vitals taken for this visit.  BP Readings from Last 5 Encounters:   03/06/25 134/70   09/04/24 136/86   03/13/24 138/82   11/16/23 118/71   08/12/20 130/60     Wt Readings from Last 5 Encounters:   03/06/25 91.2 kg (201 lb 1 oz)   09/04/24 91.5 kg (201 lb 11.5 oz)   03/13/24 94.8 kg (208 lb 15.9 oz)   11/16/23 100.1 kg (220 lb 10.9 oz)   08/12/20 107 kg (236 lb)             She appears well, in no apparent distress.  Alert and oriented times three, pleasant and cooperative. Vital signs are as documented in vital signs section.  Has some wheezing  Sinus with coryza  No facial TTP  Has mucus       Medication List with Changes/Refills   New Medications    FLUCONAZOLE (DIFLUCAN) 200 MG TAB    Take 1 tablet (200 mg total) by mouth once a week.    PREDNISONE (DELTASONE) 5 MG  TABLET    Take 3 tablets (15 mg total) by mouth once daily for 7 days, THEN 2 tablets (10 mg total) once daily for 7 days, THEN 1 tablet (5 mg total) once daily for 7 days.   Current Medications    ALBUTEROL (VENTOLIN HFA) 90 MCG/ACTUATION INHALER    Inhale 2 puffs into the lungs every 6 (six) hours as needed for Wheezing. Rescue    ALBUTEROL-IPRATROPIUM (DUO-NEB) 2.5 MG-0.5 MG/3 ML NEBULIZER SOLUTION    Take by nebulization every 6 (six) hours.    ASPIRIN (ECOTRIN) 81 MG EC TABLET    Take 81 mg by mouth.    ATORVASTATIN (LIPITOR) 20 MG TABLET    Take 20 mg by mouth.    CLOBETASOL 0.05% (TEMOVATE) 0.05 % OINT    1 application  2 (two) times daily.    DICYCLOMINE (BENTYL) 10 MG CAPSULE    take 1 CAPSULE(S) BY MOUTH TWICE DAILY AS NEEDED for stomach cramps    EZETIMIBE (ZETIA) 10 MG TABLET    10 mg.     FLUTICASONE-SALMETEROL DISKUS INHALER 250-50 MCG    Inhale 1 puff into the lungs 2 (two) times a day.    FLUTICASONE-SALMETEROL DISKUS INHALER 500-50 MCG    Inhale 1 puff into the lungs 2 (two) times daily.    IBUPROFEN (ADVIL,MOTRIN) 600 MG TABLET    Take 600 mg by mouth every 8 (eight) hours as needed.     LACTOBACILLUS RHAMNOSUS GG (CULTURELLE) 10 BILLION CELL CAPSULE    Take 1 capsule by mouth once daily.    LEVOCETIRIZINE (XYZAL) 5 MG TABLET    Take 5 mg by mouth.    LOSARTAN (COZAAR) 50 MG TABLET    Take 25 mg by mouth once daily.    MAGNESIUM, ALUMINUM HYDROXIDE (MAG-AL ORAL)    Take 400 mg by mouth once daily.    METFORMIN (GLUCOPHAGE) 500 MG TABLET    Take 500 mg by mouth 2 (two) times daily with meals.     MONTELUKAST (SINGULAIR) 10 MG TABLET    Take 1 tablet (10 mg total) by mouth every evening.    OMEPRAZOLE (PRILOSEC) 20 MG CAPSULE    TAKE 1 CAPSULE(S) BY MOUTH TWICE a day    OZEMPIC 0.25 MG OR 0.5 MG (2 MG/3 ML) PEN INJECTOR    Inject 0.5 mg into the skin every 7 days.    TIADYLT  MG 24 HR CAPSULE    Take by mouth.         ASSESSMENT AND PLAN:    Problem List Items Addressed This Visit        Essential (primary) hypertension    Relevant Orders    Hypertension Digital Medicine (HDMP) Enrollment Order (Completed)    Diabetes type 2, controlled - Primary    Relevant Orders    Diabetes Digital Medicine (DDMP) Enrollment Order (Completed)     Other Visit Diagnoses         Recurrent sinus infections        Relevant Medications    fluconazole (DIFLUCAN) 200 MG Tab      Wheezing        Relevant Medications    predniSONE (DELTASONE) 5 MG tablet              Future Appointments   Date Time Provider Department Center   9/2/2025 11:00 AM Phani Pal MD [facility]       No follow-ups on file. or sooner as needed.         [1]   Patient Active Problem List  Diagnosis    Asthma, not well controlled    Essential (primary) hypertension    Chronic allergic rhinitis    Hyperlipemia    Diabetes type 2, controlled    Chronic obstructive pulmonary disease    Breast mass    Class 3 obesity    Severe obesity (BMI 35.0-35.9 with comorbidity)

## 2025-07-10 ENCOUNTER — TELEPHONE (OUTPATIENT)
Dept: FAMILY MEDICINE | Facility: CLINIC | Age: 75
End: 2025-07-10
Payer: MEDICARE

## 2025-07-10 NOTE — TELEPHONE ENCOUNTER
Copied from CRM #[id number]. Topic: Appointments - Appointment Access  >> Jul 10, 2025 10:11 AM Mark wrote:  Type:  Sooner Appointment Request    Patient is requesting a sooner appointment.  Patient declined first available appointment listed as well as another facility and provider .  Patient will not accept being placed on the waitlist and is requesting a message be sent to doctor.    Name of Caller: daughterrina     When is the first available appointment? oct    Symptoms: check up    Would the patient rather a call back or a response via [facility]? Call     Best Call Back Number:.[phone]    Additional Information: would like to be seen within the next three to four weeks pt has had several falls within the last three weeks

## 2025-07-10 NOTE — TELEPHONE ENCOUNTER
Spoke with Luther- daughter of the patient. She states she will confirm with the patient for 16 July 2025 at 1330. Will call back to verify.

## 2025-08-06 ENCOUNTER — PATIENT MESSAGE (OUTPATIENT)
Dept: FAMILY MEDICINE | Facility: CLINIC | Age: 75
End: 2025-08-06
Payer: MEDICARE

## 2025-09-04 ENCOUNTER — PATIENT OUTREACH (OUTPATIENT)
Dept: ADMINISTRATIVE | Facility: HOSPITAL | Age: 75
End: 2025-09-04
Payer: MEDICARE